# Patient Record
Sex: FEMALE | Race: WHITE | Employment: OTHER | URBAN - METROPOLITAN AREA
[De-identification: names, ages, dates, MRNs, and addresses within clinical notes are randomized per-mention and may not be internally consistent; named-entity substitution may affect disease eponyms.]

---

## 2017-08-15 RX ORDER — ASPIRIN 325 MG
1 TABLET, DELAYED RELEASE (ENTERIC COATED) ORAL
COMMUNITY

## 2017-08-15 RX ORDER — UREA 10 %
100 LOTION (ML) TOPICAL DAILY
COMMUNITY
End: 2019-01-22

## 2017-08-18 ENCOUNTER — ANESTHESIA EVENT (OUTPATIENT)
Dept: SURGERY | Age: 72
End: 2017-08-18
Payer: MEDICARE

## 2017-08-21 ENCOUNTER — HOSPITAL ENCOUNTER (OUTPATIENT)
Age: 72
Setting detail: OUTPATIENT SURGERY
Discharge: HOME | End: 2017-08-21
Attending: ORTHOPAEDIC SURGERY | Admitting: ORTHOPAEDIC SURGERY
Payer: MEDICARE

## 2017-08-21 ENCOUNTER — ANESTHESIA (OUTPATIENT)
Dept: SURGERY | Age: 72
End: 2017-08-21
Payer: MEDICARE

## 2017-08-21 VITALS
SYSTOLIC BLOOD PRESSURE: 147 MMHG | OXYGEN SATURATION: 96 % | HEART RATE: 113 BPM | DIASTOLIC BLOOD PRESSURE: 99 MMHG | RESPIRATION RATE: 23 BRPM | TEMPERATURE: 98.1 F

## 2017-08-21 DIAGNOSIS — I48.91 ATRIAL FIBRILLATION, UNSPECIFIED TYPE (HCC): Primary | ICD-10-CM

## 2017-08-21 PROCEDURE — 74011250637 HC RX REV CODE- 250/637: Performed by: NURSE ANESTHETIST, CERTIFIED REGISTERED

## 2017-08-21 PROCEDURE — 64415 NJX AA&/STRD BRCH PLXS IMG: CPT | Performed by: ANESTHESIOLOGY

## 2017-08-21 PROCEDURE — 74011250636 HC RX REV CODE- 250/636: Performed by: NURSE ANESTHETIST, CERTIFIED REGISTERED

## 2017-08-21 PROCEDURE — 93005 ELECTROCARDIOGRAM TRACING: CPT

## 2017-08-21 PROCEDURE — 74011250636 HC RX REV CODE- 250/636: Performed by: ANESTHESIOLOGY

## 2017-08-21 PROCEDURE — 74011250636 HC RX REV CODE- 250/636

## 2017-08-21 PROCEDURE — 74011250637 HC RX REV CODE- 250/637: Performed by: PHYSICIAN ASSISTANT

## 2017-08-21 PROCEDURE — 76942 ECHO GUIDE FOR BIOPSY: CPT | Performed by: ANESTHESIOLOGY

## 2017-08-21 RX ORDER — FENTANYL CITRATE 50 UG/ML
100 INJECTION, SOLUTION INTRAMUSCULAR; INTRAVENOUS ONCE
Status: COMPLETED | OUTPATIENT
Start: 2017-08-21 | End: 2017-08-21

## 2017-08-21 RX ORDER — METOPROLOL SUCCINATE 25 MG/1
25 TABLET, EXTENDED RELEASE ORAL DAILY
Status: DISCONTINUED | OUTPATIENT
Start: 2017-08-21 | End: 2017-08-21 | Stop reason: HOSPADM

## 2017-08-21 RX ORDER — LIDOCAINE HYDROCHLORIDE 10 MG/ML
0.1 INJECTION, SOLUTION EPIDURAL; INFILTRATION; INTRACAUDAL; PERINEURAL AS NEEDED
Status: DISCONTINUED | OUTPATIENT
Start: 2017-08-21 | End: 2017-08-21 | Stop reason: HOSPADM

## 2017-08-21 RX ORDER — CEFAZOLIN SODIUM 2 G/50ML
2 SOLUTION INTRAVENOUS
Status: DISCONTINUED | OUTPATIENT
Start: 2017-08-21 | End: 2017-08-21 | Stop reason: HOSPADM

## 2017-08-21 RX ORDER — MIDAZOLAM HYDROCHLORIDE 1 MG/ML
2 INJECTION, SOLUTION INTRAMUSCULAR; INTRAVENOUS ONCE
Status: COMPLETED | OUTPATIENT
Start: 2017-08-21 | End: 2017-08-21

## 2017-08-21 RX ORDER — FENTANYL CITRATE 50 UG/ML
100 INJECTION, SOLUTION INTRAMUSCULAR; INTRAVENOUS ONCE
Status: DISCONTINUED | OUTPATIENT
Start: 2017-08-21 | End: 2017-08-21 | Stop reason: HOSPADM

## 2017-08-21 RX ORDER — METOPROLOL SUCCINATE 25 MG/1
25 TABLET, EXTENDED RELEASE ORAL DAILY
Qty: 30 TAB | Refills: 1 | Status: SHIPPED | OUTPATIENT
Start: 2017-08-21 | End: 2017-10-16 | Stop reason: SDUPTHER

## 2017-08-21 RX ORDER — FAMOTIDINE 20 MG/1
20 TABLET, FILM COATED ORAL ONCE
Status: COMPLETED | OUTPATIENT
Start: 2017-08-21 | End: 2017-08-21

## 2017-08-21 RX ORDER — SODIUM CHLORIDE, SODIUM LACTATE, POTASSIUM CHLORIDE, CALCIUM CHLORIDE 600; 310; 30; 20 MG/100ML; MG/100ML; MG/100ML; MG/100ML
25 INJECTION, SOLUTION INTRAVENOUS CONTINUOUS
Status: DISCONTINUED | OUTPATIENT
Start: 2017-08-21 | End: 2017-08-21 | Stop reason: HOSPADM

## 2017-08-21 RX ADMIN — FAMOTIDINE 20 MG: 20 TABLET ORAL at 11:21

## 2017-08-21 RX ADMIN — METOPROLOL SUCCINATE 25 MG: 25 TABLET, EXTENDED RELEASE ORAL at 15:35

## 2017-08-21 RX ADMIN — FENTANYL CITRATE 50 MCG: 50 INJECTION, SOLUTION INTRAMUSCULAR; INTRAVENOUS at 11:59

## 2017-08-21 RX ADMIN — MIDAZOLAM HYDROCHLORIDE 2 MG: 1 INJECTION, SOLUTION INTRAMUSCULAR; INTRAVENOUS at 11:59

## 2017-08-21 NOTE — PROGRESS NOTES
Per Dr. Vicki Burris, Echo was ordered for Afib.      Verbal order and read back per Valery Looney MD

## 2017-08-21 NOTE — PERIOP NOTES
Spoke with Glenroy Banda at Dr. Misti Correa office regarding prescription for BB to be taken daily at home. Per Glenroy Banda, Dr. Misti Correa nurse had script for him to sign and it will be faxed to [place]. Pt has taken po Toprol XL 25mg as ordered prior to discharge. Pt discharged for home via w/c.

## 2017-08-21 NOTE — H&P
H&P Update:  [facility] was seen and examined. History and physical has been reviewed. The patient has been examined. There have been no significant clinical changes since the completion of the originally dated History and Physical.  Patient identified by surgeon; surgical site was confirmed by patient and surgeon.     Heart RRR, NOMGR  Chest CTAB    Proceed with surgery    Signed By: Moris Ross MD     August 21, 2017 12:19 PM

## 2017-08-21 NOTE — CONSULTS
[facility] - Consult Note    Date of  Admission: 8/21/2017 10:14 AM   Primary Care Physician:  Bay Nelson NP  Patient seen and examined independently. Agree with assessment below. Will arrange for prescription for her for the BB. Agree with assessment and plan as noted below. Anita Mendez MD   Assessment:     -Paroxysmal atrial fibrillation, no hx, spontaneously converted to NSR.  [id number] score 2.   -Torn rotator cuff, ambulatory surgery cancelled today and reschedule for next week. Plan:     -Will start patient on Toprol 25mg daily. (ordered). Discussed purpose of medication in detail with patient. She is in agreement with plan. -Pt is to continue 81mg ASA as she is already taking at home.    -Would like to see patient in our office in 1 week. I have sent a message to our office. They will be calling her at home to schedule an echo and her appointment. History of Present Illness: This is a 66 y.o. female with PMHx significant for rotator cuff tear who presented for day surgery to repair rotator cuff. She was given brachial block and was noted to go into afib with rvr. Pt was asymptomatic. Surgery was cancelled. Pt has no significant PMHx. She takes a multivitamin, 81mg ASA prophylactically, and vit D. She has no hx of arrhythmias or murmurs. She is not a smoker. She eats a heart healthy diet. She exercises regularly. She had one episode of syncope about ten years ago with negative work up thereafter. She had a normal stress test and no arrhythmic events were found at that time. She has not had any recurrent events.      Cardiac risk factors:   No known risk factors    Review of Symptoms:  Except as stated above include:  Constitutional:  Negative for fevers/chills, or fatigue  Respiratory:  Negative for sob, lewis, cough, or congestion  Cardiovascular:  Negative for chest pain, palpitations, LE swelling or orthopnea  Gastrointestinal: negative for abd pain, nausea or vomiting  Genitourinary:  Negative for hematuria or dysuria  Musculoskeletal:  Negative for muscle aches, positive for R shoulder pain  Neurological:  Negative for syncope or dizziness       Past Medical History:     Past Medical History:   Diagnosis Date    GERD (gastroesophageal reflux disease)          Social History:     Social History     Social History    Marital status:      Spouse name: N/A    Number of children: N/A    Years of education: N/A     Social History Main Topics    Smoking status: Former Smoker    Smokeless tobacco: Never Used    Alcohol use No    Drug use: No    Sexual activity: Not Asked     Other Topics Concern    None     Social History Narrative        Family History:   History reviewed. No pertinent family history. Medications:   No Known Allergies     Current Facility-Administered Medications   Medication Dose Route Frequency    ceFAZolin (ANCEF) 2g IVPB in 50 mL D5W  2 g IntraVENous ON CALL TO OR    lidocaine (PF) (XYLOCAINE) 10 mg/mL (1 %) injection 0.1 mL  0.1 mL SubCUTAneous PRN    lactated Ringers infusion  25 mL/hr IntraVENous CONTINUOUS    fentaNYL citrate (PF) injection 100 mcg  100 mcg IntraVENous ONCE         Physical Exam:     Visit Vitals    /60    Pulse (!) 130    Temp 98.1 °F (36.7 °C)    Resp 16    SpO2 98%     BP Readings from Last 3 Encounters:   08/21/17 135/60   08/21/13 133/74     Pulse Readings from Last 3 Encounters:   08/21/17 (!) 130   08/21/13 82     Wt Readings from Last 3 Encounters:   08/21/13 66.7 kg (147 lb)       General:  Awake, alert, oriented x 3  Neck:  Supple, no jvd  Lungs:  Clear to auscultation bilat  Heart:  Tachy, regular rhythm  Abdomen: soft, non-tender  Extremities:  No LE edema, atraumatic, no cyanosis     Data Review:     No results for input(s): WBC, HGB, HCT, PLT, HGBEXT, HCTEXT, PLTEXT in the last 72 hours.   No results for input(s): NA, K, CL, CO2, GLU, BUN, CREA, CA, MG, PHOS, ALB, TBIL, SGOT, ALT, INR in the last 72 hours. No lab exists for component:  BNP, INREXT    No results for input(s): TROIQ, CPK, CKMB in the last 72 hours.   Last Lipid:    Lab Results   Component Value Date/Time    Cholesterol, total 150 11/10/2010 08:29 AM    HDL Cholesterol 52 11/10/2010 08:29 AM    LDL cholesterol 108 07/07/2010 08:11 AM    LDL, calculated 75.4 11/10/2010 08:29 AM    Triglyceride 113 11/10/2010 08:29 AM    CHOL/HDL Ratio 2.9 11/10/2010 08:29 AM       Signed By: ALLISON Alvarez     August 21, 2017

## 2017-08-21 NOTE — ANESTHESIA PREPROCEDURE EVALUATION
Anesthetic History   No history of anesthetic complications            Review of Systems / Medical History  Patient summary reviewed and pertinent labs reviewed    Pulmonary  Within defined limits                 Neuro/Psych   Within defined limits           Cardiovascular                  Exercise tolerance: >4 METS     GI/Hepatic/Renal  Within defined limits   GERD           Endo/Other  Within defined limits           Other Findings   Comments:   Risk Factors for Postoperative nausea/vomiting:       History of postoperative nausea/vomiting? NO       Female? YES       Motion sickness? NO       Intended opioid administration for postoperative analgesia? YES      Smoking Abstinence  Current Smoker? NO  Elective Surgery? YES  Seen preoperatively by anesthesiologist or proxy prior to day of surgery? YES  Pt abstained from smoking 24 hours prior to anesthesia?  YES               Physical Exam    Airway  Mallampati: II  TM Distance: 4 - 6 cm  Neck ROM: normal range of motion   Mouth opening: Normal     Cardiovascular    Rhythm: regular  Rate: normal         Dental  No notable dental hx       Pulmonary  Breath sounds clear to auscultation               Abdominal  GI exam deferred       Other Findings            Anesthetic Plan    ASA: 2  Anesthesia type: general and regional - interscalene block          Induction: Intravenous  Anesthetic plan and risks discussed with: Patient

## 2017-08-21 NOTE — PROGRESS NOTES
Anesthesia Note    Patient developed new onset atrial fibrillation after interscalene block. Discussed with PCP, Alireza CHEW, who asked for in-house cardiology consult. Dr. Reyna Babinski evaluated the patient and will workup patient has an outpatient. Patient okay to go home.     8/21/2017  5:40 PM  Deneen Cutler MD

## 2017-08-21 NOTE — ANESTHESIA PROCEDURE NOTES
Peripheral Block    Start time: 8/21/2017 11:50 AM  End time: 8/21/2017 12:08 PM  Performed by: Ollie Villarreal  Authorized by: Ollie Villarreal       Pre-procedure: Indications: at surgeon's request and post-op pain management    Preanesthetic Checklist: patient identified, risks and benefits discussed, site marked, timeout performed, anesthesia consent given and patient being monitored    Timeout Time: 11:50          Block Type:   Block Type:   Interscalene  Laterality:  Right  Monitoring:  Standard ASA monitoring  Injection Technique:  Single shot  Procedures: ultrasound guided    Patient Position: supine  Prep: chlorhexidine    Location:  Interscalene  Needle Type:  Ultraplex  Needle Gauge:  22 G  Needle Localization:  Ultrasound guidance  Medication Injected:  0.5%  bupivacaine  Adds:  Epi 1:200K  Volume (mL):  30    Assessment:  Number of attempts:  1  Injection Assessment:  Incremental injection every 5 mL, local visualized surrounding nerve on ultrasound, negative aspiration for blood, no intravascular symptoms, no paresthesia, negative aspiration for CSF and ultrasound image on chart  Patient tolerance:  Patient tolerated the procedure well with no immediate complications  Signed by Dr. Marielena Lopez

## 2017-08-21 NOTE — ANESTHESIA PROCEDURE NOTES
Peripheral Block    Start time: 8/21/2017 11:50 AM  End time: 8/21/2017 12:02 PM  Performed by: Lori Darby  Authorized by: Lori Darby       Pre-procedure: Indications: at surgeon's request and post-op pain management    Preanesthetic Checklist: patient identified, risks and benefits discussed, site marked, timeout performed, anesthesia consent given and patient being monitored    Timeout Time: 11:50          Block Type:   Block Type:   Interscalene  Laterality:  Right  Monitoring:  Standard ASA monitoring  Injection Technique:  Single shot  Procedures: ultrasound guided    Patient Position: supine  Prep: chlorhexidine    Location:  Interscalene  Needle Type:  Ultraplex  Needle Gauge:  22 G  Needle Localization:  Ultrasound guidance  Medication Injected:  0.5%  bupivacaine  Adds:  Epi 1:200K  Volume (mL):  30    Assessment:  Number of attempts:  1  Injection Assessment:  Incremental injection every 5 mL, negative aspiration for CSF, no paresthesia, ultrasound image on chart, no intravascular symptoms, negative aspiration for blood and local visualized surrounding nerve on ultrasound  Patient tolerance:  Patient tolerated the procedure well with no immediate complications  Signed by Dr. Peri Kinsey

## 2017-08-22 LAB
ATRIAL RATE: 102 BPM
CALCULATED R AXIS, ECG10: -8 DEGREES
CALCULATED T AXIS, ECG11: -97 DEGREES
DIAGNOSIS, 93000: NORMAL
Q-T INTERVAL, ECG07: 340 MS
QRS DURATION, ECG06: 88 MS
QTC CALCULATION (BEZET), ECG08: 500 MS
VENTRICULAR RATE, ECG03: 130 BPM

## 2017-08-25 ENCOUNTER — HOSPITAL ENCOUNTER (OUTPATIENT)
Dept: NON INVASIVE DIAGNOSTICS | Age: 72
Discharge: HOME | End: 2017-08-25
Attending: INTERNAL MEDICINE
Payer: MEDICARE

## 2017-08-25 DIAGNOSIS — I48.91 ATRIAL FIBRILLATION, UNSPECIFIED TYPE (HCC): ICD-10-CM

## 2017-08-25 PROCEDURE — 93306 TTE W/DOPPLER COMPLETE: CPT

## 2017-08-28 ENCOUNTER — OFFICE VISIT (OUTPATIENT)
Dept: CARDIOLOGY CLINIC | Age: 72
End: 2017-08-28

## 2017-08-28 VITALS
OXYGEN SATURATION: 98 % | SYSTOLIC BLOOD PRESSURE: 148 MMHG | HEIGHT: 65 IN | WEIGHT: 149 LBS | BODY MASS INDEX: 24.83 KG/M2 | HEART RATE: 74 BPM | DIASTOLIC BLOOD PRESSURE: 76 MMHG

## 2017-08-28 DIAGNOSIS — I48.0 PAF (PAROXYSMAL ATRIAL FIBRILLATION) (HCC): ICD-10-CM

## 2017-08-28 DIAGNOSIS — I77.9 CAROTID DISEASE, BILATERAL (HCC): ICD-10-CM

## 2017-08-28 DIAGNOSIS — I51.89 DIASTOLIC DYSFUNCTION: ICD-10-CM

## 2017-08-28 DIAGNOSIS — Z01.818 PREOPERATIVE CLEARANCE: ICD-10-CM

## 2017-08-28 DIAGNOSIS — R00.2 PALPITATIONS: ICD-10-CM

## 2017-08-28 DIAGNOSIS — I48.91 ATRIAL FIBRILLATION, UNSPECIFIED TYPE (HCC): Primary | ICD-10-CM

## 2017-08-28 NOTE — MR AVS SNAPSHOT
Visit Information Date & Time Provider Department Dept. Phone Encounter #  
 8/28/2017  1:00 PM Anna Zhou MD [address] [place] [address] Specialist at [facility] [phone] [id number] Follow-up Instructions Return in about 6 weeks (around 10/9/2017). Upcoming Health Maintenance Date Due DTaP/Tdap/Td series (1 - Tdap) 11/12/1965 FOBT Q 1 YEAR AGE 50-75 11/12/1994 ZOSTER VACCINE AGE 60> 9/12/2004 GLAUCOMA SCREENING Q2Y 11/12/2009 Pneumococcal 65+ Low/Medium Risk (1 of 2 - PCV13) 11/12/2009 MEDICARE YEARLY EXAM 11/12/2009 BREAST CANCER SCRN MAMMOGRAM 6/20/2013 INFLUENZA AGE 9 TO ADULT 8/1/2017 Allergies as of 8/28/2017  Review Complete On: 8/28/2017 By: Phi Sierra LPN No Known Allergies Current Immunizations  Never Reviewed No immunizations on file. Not reviewed this visit You Were Diagnosed With   
  
 Codes Comments At[facility] unspecified [facility]    -  Primary ICD-10-CM: I48.91 
ICD-9-CM: 427.31 Vitals BP Pulse Height(growth percentile) Weight(growth percentile) SpO2 BMI  
 148/76 74 5' 5\" (1.651 m) 149 lb (67.6 kg) 98% 24.79 kg/m2 OB Status Smoking Status Hysterectomy Former Smoker BMI and BSA Data Body Mass Index Body Surface Area 24.79 kg/m 2 1.76 m 2 Preferred Pharmacy Pharmacy Name Phone [address] [place] [address] [phone] Your Updated Medication List  
  
   
This list is accurate as of: 8/28/17  1:56 PM.  Always use your most recent med list.  
  
  
  
  
 aspirin 81 mg tablet Take 81 mg by mouth.  
  
 calcium 600 mg Cap Take  by mouth. Cholecalciferol (Vitamin D3) 50,000 unit Cap Take 1 Cap by mouth every seven (7) days. cyanocobalamin 100 mcg tablet Commonly known as:  VITAMIN B12 Take 100 mcg by mouth daily. metoprolol succinate 25 mg XL tablet Commonly known as:  TOPROL-XL Take 1 Tab by mouth daily. omega 3-dha-epa-fish oil 100-160-1,000 mg Cap Take  by mouth. We Performed the Following AMB POC EKG ROUTINE W/ 12 LEADS, INTER & REP [34120 CPT(R)] Follow-up Instructions Return in about 6 weeks (around 10/9/2017). Introducing [facility]! Julia Dangelo introduces [employer] patient portal. Now you can access parts of your medical record, email your doctor's office, and request medication refills online. 1. In your internet browser, go to [email or web address] 2. Click on the First Time User? Click Here link in the Sign In box. You will see the New Member Sign Up page. 3. Enter your [employer] Access Code exactly as it appears below. You will not need to use this code after youve completed the sign-up process. If you do not sign up before the expiration date, you must request a new code. · [employer] Access Code: [email or web address] Expires: 11/19/2017 10:11 AM 
 
4. Enter the last four digits of your Social Security Number (xxxx) and Date of Birth (mm/dd/yyyy) as indicated and click Submit. You will be taken to the next sign-up page. 5. Create a [employer] ID. This will be your [employer] login ID and cannot be changed, so think of one that is secure and easy to remember. 6. Create a [employer] password. You can change your password at any time. 7. Enter your Password Reset Question and Answer. This can be used at a later time if you forget your password. 8. Enter your e-mail address. You will receive e-mail notification when new information is available in [address] 9. Click Sign Up. You can now view and download portions of your medical record. 10. Click the Download Summary menu link to download a portable copy of your medical information. If you have questions, please visit the Frequently Asked Questions section of the [employer] website.  Remember, [employer] is NOT to be used for urgent needs. For medical emergencies, dial 911. Now available from your iPhone and Android! Please provide this summary of care documentation to your next provider. Your primary care clinician is listed as Dorene Severe. If you have any questions after today's visit, please call [phone].

## 2017-08-28 NOTE — PROGRESS NOTES
1. Have you been to the ER, urgent care clinic since your last visit? Hospitalized since your last visit? No    2. Have you seen or consulted any other health care providers outside of the [address] [place] [address] since your last visit? Include any pap smears or colon screening.  No

## 2017-08-31 PROBLEM — Z01.818 PREOPERATIVE CLEARANCE: Status: ACTIVE | Noted: 2017-08-31

## 2017-08-31 PROBLEM — I51.89 DIASTOLIC DYSFUNCTION: Status: ACTIVE | Noted: 2017-08-31

## 2017-08-31 PROBLEM — R00.2 PALPITATIONS: Status: ACTIVE | Noted: 2017-08-31

## 2017-08-31 PROBLEM — I77.9 CAROTID DISEASE, BILATERAL (HCC): Status: ACTIVE | Noted: 2017-08-31

## 2017-08-31 PROBLEM — I48.0 PAF (PAROXYSMAL ATRIAL FIBRILLATION) (HCC): Status: ACTIVE | Noted: 2017-08-31

## 2017-08-31 NOTE — PROGRESS NOTES
Subjective:      Sana Gupta is in the office today for cardiac evaluation. She is a 66year-old woman that was seen at [facility] on 08/21/2017. At that time, she was about to have rotator cuff surgery. She developed atrial fibrillation while in the presurgical area. A short time later, she did spontaneously convert to sinus rhythm. She was given a prescription for low dose beta-blocker in the form of Toprol XL 25 mg a day and was told to follow up in the office, which is why she is here today. In the office today, she says she is doing well. She does notice some palpitations where she says it feels like her heart beats faster at times. She has been walking in the morning without any limiting symptoms. She has had no chest pain. She has had no limiting shortness of breath, PND or orthopnea. She has had no near syncope or syncope. Her calculated CHADS2- VASc score was 3 with a 3.2% incidence of stroke per year without anticoagulation. Patient Active Problem List    Diagnosis Date Noted    Preoperative clearance 08/31/2017    PAF (paroxysmal atrial fibrillation) ([facility]) 16/93/1549    Diastolic dysfunction 27/80/6173    Palpitations 08/31/2017    Carotid disease, bilateral ([facility] 08/31/2017     Current Outpatient Prescriptions   Medication Sig Dispense Refill    metoprolol succinate (TOPROL-XL) 25 mg XL tablet Take 1 Tab by mouth daily. 30 Tab 1    cyanocobalamin (VITAMIN B12) 100 mcg tablet Take 100 mcg by mouth daily.  Cholecalciferol, Vitamin D3, 50,000 unit cap Take 1 Cap by mouth every seven (7) days.  aspirin 81 mg tablet Take 81 mg by mouth.  omega 3-dha-epa-fish oil 100-160-1,000 mg cap Take  by mouth.  calcium 600 mg cap Take  by mouth.        No Known Allergies  Past Medical History:   Diagnosis Date    GERD (gastroesophageal reflux disease)      Past Surgical History:   Procedure Laterality Date    ABDOMEN SURGERY PROC UNLISTED appendectomy    HX GYN      partial hysterectomy    HX ORTHOPAEDIC      knee arthroscopic, Bunionectomy    HX ROTATOR CUFF REPAIR Right      No family history on file. History   Smoking Status    Former Smoker   Smokeless Tobacco    Never Used          Review of Systems, additional:  Constitutional: negative  Eyes: negative  Respiratory: negative  Cardiovascular: positive for palpitations  Gastrointestinal: negative  Musculoskeletal:negative  Neurological: negative  Behvioral/Psych: negative  Endocrine: negative  ENT: negative    Objective:     Visit Vitals    /76    Pulse 74    Ht 5' 5\" (1.651 m)    Wt 149 lb (67.6 kg)    SpO2 98%    BMI 24.79 kg/m2     General:  alert, cooperative, no distress   Chest Wall: inspection normal - no chest wall deformities or tenderness, respiratory effort normal   Lung: clear to auscultation bilaterally   Heart:  normal rate and regular rhythm, S1 and S2 normal, no murmurs noted, no gallops noted   Abdomen: soft, non-tender. Bowel sounds normal. No masses,  no organomegaly   Extremities: extremities normal, atraumatic, no cyanosis or edema Skin: no rashes   Neuro: alert, oriented, normal speech, no focal findings or movement disorder noted     EK2017; Sinus rhythm. Diffuse nondiagnostic ST & T wave abnormalities    Assessment/Plan:       ICD-10-CM ICD-9-CM    1. Atrial fibrillation, unspecified type ([address] in sinus rhythm in office today I48.91 427.31 AMB POC EKG ROUTINE W/ 12 LEADS, INTER & REP   2. PAF (paroxysmal atrial fibrillation) ([facility]) I48.0 427.31    3. Diastolic dysfunction, recent echo 2536; normal systolic function. Grade 1 diastolic dysfunction. Mild LAE. I51.9 429.9    4. Palpitations R00.2 785.1    5. Preoperative clearance, plan to repeat 12 lead in office in 10 days. Continue BB. RT 6 weeks. No cardiac contraindication to proposed surgery. Z01.818 V72.84    6.  Carotid disease, bilateral ([facility]) I77.9 447.9

## 2017-09-07 ENCOUNTER — CLINICAL SUPPORT (OUTPATIENT)
Dept: CARDIOLOGY CLINIC | Age: 72
End: 2017-09-07

## 2017-09-07 ENCOUNTER — DOCUMENTATION ONLY (OUTPATIENT)
Dept: CARDIOLOGY CLINIC | Age: 72
End: 2017-09-07

## 2017-09-07 DIAGNOSIS — I48.0 PAF (PAROXYSMAL ATRIAL FIBRILLATION) (HCC): Primary | ICD-10-CM

## 2017-09-08 VITALS — OXYGEN SATURATION: 98 % | SYSTOLIC BLOOD PRESSURE: 140 MMHG | HEART RATE: 76 BPM | DIASTOLIC BLOOD PRESSURE: 70 MMHG

## 2017-09-08 NOTE — PROGRESS NOTES
Sharon Cordoba is a 66 y.o. female that is here for a blood pressure check. Her current medications are listed below. Current Outpatient Prescriptions   Medication Sig    metoprolol succinate (TOPROL-XL) 25 mg XL tablet Take 1 Tab by mouth daily.  cyanocobalamin (VITAMIN B12) 100 mcg tablet Take 100 mcg by mouth daily.  Cholecalciferol, Vitamin D3, 50,000 unit cap Take 1 Cap by mouth every seven (7) days.  aspirin 81 mg tablet Take 81 mg by mouth.  omega 3-dha-epa-fish oil 100-160-1,000 mg cap Take  by mouth.  calcium 600 mg cap Take  by mouth. No current facility-administered medications for this visit. Her   Visit Vitals    /70    Pulse 76    SpO2 98%             Patient was here today for a blood pressure check and EKG for surgery clearance. Patient has no complaints or concerns at this time. Vitals are as above. Plan:     Consulted with Dr Shruthi Hutson in the office today. Per Dr Shruthi Hutson, patient is cleared for surgery after reviewing EKG results. Patient to continue all medications. Patient verbalized understanding. Verbal order and read back per Dr Santa Morris.

## 2017-09-12 ENCOUNTER — TELEPHONE (OUTPATIENT)
Dept: CARDIOLOGY CLINIC | Age: 72
End: 2017-09-12

## 2017-09-12 NOTE — TELEPHONE ENCOUNTER
Verbal order and read back per Akila Samuel MD    Patient is cleared for surgery. Continue metoprolol, at least for a month after surgery then she will need to come back to follow up with Dr. Xavi Huffman. Clearance faxed to Dr. Adelita Campa at [phone] and Dr. Markus Pereyra [phone].

## 2017-09-29 ENCOUNTER — ANESTHESIA EVENT (OUTPATIENT)
Dept: SURGERY | Age: 72
End: 2017-09-29
Payer: MEDICARE

## 2017-09-29 RX ORDER — FLUTICASONE PROPIONATE 50 MCG
1 SPRAY, SUSPENSION (ML) NASAL AS NEEDED
COMMUNITY
Start: 2017-07-14 | End: 2018-07-02 | Stop reason: ALTCHOICE

## 2017-10-02 ENCOUNTER — ANESTHESIA (OUTPATIENT)
Dept: SURGERY | Age: 72
End: 2017-10-02
Payer: MEDICARE

## 2017-10-02 ENCOUNTER — HOSPITAL ENCOUNTER (OUTPATIENT)
Age: 72
Setting detail: OUTPATIENT SURGERY
Discharge: HOME | End: 2017-10-02
Attending: ORTHOPAEDIC SURGERY | Admitting: ORTHOPAEDIC SURGERY
Payer: MEDICARE

## 2017-10-02 VITALS
HEART RATE: 67 BPM | SYSTOLIC BLOOD PRESSURE: 155 MMHG | TEMPERATURE: 97 F | RESPIRATION RATE: 18 BRPM | DIASTOLIC BLOOD PRESSURE: 72 MMHG | WEIGHT: 150 LBS | OXYGEN SATURATION: 96 % | HEIGHT: 62 IN | BODY MASS INDEX: 27.6 KG/M2

## 2017-10-02 PROCEDURE — 74011000250 HC RX REV CODE- 250

## 2017-10-02 PROCEDURE — 77030006884 HC BLD SHV INCIS S&N -B: Performed by: ORTHOPAEDIC SURGERY

## 2017-10-02 PROCEDURE — C9353 NEURAWRAP NERVE PROTECTOR,CM: HCPCS | Performed by: ORTHOPAEDIC SURGERY

## 2017-10-02 PROCEDURE — 77030008463 HC STPLR SKN PROX J&J -B: Performed by: ORTHOPAEDIC SURGERY

## 2017-10-02 PROCEDURE — 76060000035 HC ANESTHESIA 2 TO 2.5 HR: Performed by: ORTHOPAEDIC SURGERY

## 2017-10-02 PROCEDURE — 74011000250 HC RX REV CODE- 250: Performed by: ORTHOPAEDIC SURGERY

## 2017-10-02 PROCEDURE — 77030008477 HC STYL SATN SLP COVD -A: Performed by: ANESTHESIOLOGY

## 2017-10-02 PROCEDURE — 77030032490 HC SLV COMPR SCD KNE COVD -B: Performed by: ORTHOPAEDIC SURGERY

## 2017-10-02 PROCEDURE — 77030010427: Performed by: ORTHOPAEDIC SURGERY

## 2017-10-02 PROCEDURE — 77030010428: Performed by: ORTHOPAEDIC SURGERY

## 2017-10-02 PROCEDURE — 77030025635 HC SUT PASS XPRS J&J -C: Performed by: ORTHOPAEDIC SURGERY

## 2017-10-02 PROCEDURE — 74011250636 HC RX REV CODE- 250/636: Performed by: NURSE ANESTHETIST, CERTIFIED REGISTERED

## 2017-10-02 PROCEDURE — 77030004451 HC BUR SHV S&N -B: Performed by: ORTHOPAEDIC SURGERY

## 2017-10-02 PROCEDURE — 74011250636 HC RX REV CODE- 250/636: Performed by: ANESTHESIOLOGY

## 2017-10-02 PROCEDURE — 74011250636 HC RX REV CODE- 250/636: Performed by: ORTHOPAEDIC SURGERY

## 2017-10-02 PROCEDURE — 74011250636 HC RX REV CODE- 250/636

## 2017-10-02 PROCEDURE — C1713 ANCHOR/SCREW BN/BN,TIS/BN: HCPCS | Performed by: ORTHOPAEDIC SURGERY

## 2017-10-02 PROCEDURE — 77030011640 HC PAD GRND REM COVD -A: Performed by: ORTHOPAEDIC SURGERY

## 2017-10-02 PROCEDURE — 77030013079 HC BLNKT BAIR HGGR 3M -A: Performed by: ANESTHESIOLOGY

## 2017-10-02 PROCEDURE — 76210000021 HC REC RM PH II 0.5 TO 1 HR: Performed by: ORTHOPAEDIC SURGERY

## 2017-10-02 PROCEDURE — 77030002916 HC SUT ETHLN J&J -A: Performed by: ORTHOPAEDIC SURGERY

## 2017-10-02 PROCEDURE — 76942 ECHO GUIDE FOR BIOPSY: CPT | Performed by: ORTHOPAEDIC SURGERY

## 2017-10-02 PROCEDURE — 77030020274 HC MISC IMPL ORTHOPEDIC: Performed by: ORTHOPAEDIC SURGERY

## 2017-10-02 PROCEDURE — 76210000016 HC OR PH I REC 1 TO 1.5 HR: Performed by: ORTHOPAEDIC SURGERY

## 2017-10-02 PROCEDURE — 77030018834: Performed by: ORTHOPAEDIC SURGERY

## 2017-10-02 PROCEDURE — 77030038012 HC WND COBLATN S&N -F: Performed by: ORTHOPAEDIC SURGERY

## 2017-10-02 PROCEDURE — 77030008683 HC TU ET CUF COVD -A: Performed by: ANESTHESIOLOGY

## 2017-10-02 PROCEDURE — 64450 NJX AA&/STRD OTHER PN/BRANCH: CPT | Performed by: ORTHOPAEDIC SURGERY

## 2017-10-02 PROCEDURE — 74011250637 HC RX REV CODE- 250/637

## 2017-10-02 PROCEDURE — 74011000272 HC RX REV CODE- 272: Performed by: ORTHOPAEDIC SURGERY

## 2017-10-02 PROCEDURE — 76010000131 HC OR TIME 2 TO 2.5 HR: Performed by: ORTHOPAEDIC SURGERY

## 2017-10-02 DEVICE — HEALIX ADVANCE BR 3 SUTURE ANCHOR W/ORTHOCORD TCP/PLGA ABSORBABLE ANCHOR (1) VIOLET (1) BLUE STRAND (1) BLUE STRIPED, SIZE 2 (5 METRIC) ORTHOCORD BRAIDED COMPOSITE SUTURE, 36 INCHES (91CM) 4.5MM
Type: IMPLANTABLE DEVICE | Site: SHOULDER | Status: FUNCTIONAL
Brand: HEALIX ADVANCE ORTHOCORD

## 2017-10-02 DEVICE — IMPLANT BIO TISS CLLGN TEND WRP TENOMEND: Type: IMPLANTABLE DEVICE | Site: SHOULDER | Status: FUNCTIONAL

## 2017-10-02 RX ORDER — FAMOTIDINE 20 MG/1
20 TABLET, FILM COATED ORAL ONCE
Status: COMPLETED | OUTPATIENT
Start: 2017-10-02 | End: 2017-10-02

## 2017-10-02 RX ORDER — SODIUM CHLORIDE, SODIUM LACTATE, POTASSIUM CHLORIDE, CALCIUM CHLORIDE 600; 310; 30; 20 MG/100ML; MG/100ML; MG/100ML; MG/100ML
50 INJECTION, SOLUTION INTRAVENOUS CONTINUOUS
Status: DISCONTINUED | OUTPATIENT
Start: 2017-10-02 | End: 2017-10-02 | Stop reason: HOSPADM

## 2017-10-02 RX ORDER — DEXAMETHASONE SODIUM PHOSPHATE 4 MG/ML
INJECTION, SOLUTION INTRA-ARTICULAR; INTRALESIONAL; INTRAMUSCULAR; INTRAVENOUS; SOFT TISSUE AS NEEDED
Status: DISCONTINUED | OUTPATIENT
Start: 2017-10-02 | End: 2017-10-02 | Stop reason: HOSPADM

## 2017-10-02 RX ORDER — FENTANYL CITRATE 50 UG/ML
50 INJECTION, SOLUTION INTRAMUSCULAR; INTRAVENOUS AS NEEDED
Status: DISCONTINUED | OUTPATIENT
Start: 2017-10-02 | End: 2017-10-02 | Stop reason: HOSPADM

## 2017-10-02 RX ORDER — FENTANYL CITRATE 50 UG/ML
INJECTION, SOLUTION INTRAMUSCULAR; INTRAVENOUS AS NEEDED
Status: DISCONTINUED | OUTPATIENT
Start: 2017-10-02 | End: 2017-10-02 | Stop reason: HOSPADM

## 2017-10-02 RX ORDER — MIDAZOLAM HYDROCHLORIDE 1 MG/ML
2 INJECTION, SOLUTION INTRAMUSCULAR; INTRAVENOUS ONCE
Status: COMPLETED | OUTPATIENT
Start: 2017-10-02 | End: 2017-10-02

## 2017-10-02 RX ORDER — FENTANYL CITRATE 50 UG/ML
100 INJECTION, SOLUTION INTRAMUSCULAR; INTRAVENOUS ONCE
Status: COMPLETED | OUTPATIENT
Start: 2017-10-02 | End: 2017-10-02

## 2017-10-02 RX ORDER — FAMOTIDINE 20 MG/1
TABLET, FILM COATED ORAL
Status: COMPLETED
Start: 2017-10-02 | End: 2017-10-02

## 2017-10-02 RX ORDER — SUCCINYLCHOLINE CHLORIDE 20 MG/ML
INJECTION INTRAMUSCULAR; INTRAVENOUS AS NEEDED
Status: DISCONTINUED | OUTPATIENT
Start: 2017-10-02 | End: 2017-10-02 | Stop reason: HOSPADM

## 2017-10-02 RX ORDER — SODIUM CHLORIDE 0.9 % (FLUSH) 0.9 %
5-10 SYRINGE (ML) INJECTION AS NEEDED
Status: DISCONTINUED | OUTPATIENT
Start: 2017-10-02 | End: 2017-10-02 | Stop reason: HOSPADM

## 2017-10-02 RX ORDER — OXYCODONE AND ACETAMINOPHEN 5; 325 MG/1; MG/1
1-2 TABLET ORAL
Qty: 50 TAB | Refills: 0 | Status: SHIPPED | OUTPATIENT
Start: 2017-10-02 | End: 2017-10-16 | Stop reason: SINTOL

## 2017-10-02 RX ORDER — PROPOFOL 10 MG/ML
INJECTION, EMULSION INTRAVENOUS AS NEEDED
Status: DISCONTINUED | OUTPATIENT
Start: 2017-10-02 | End: 2017-10-02 | Stop reason: HOSPADM

## 2017-10-02 RX ORDER — INSULIN LISPRO 100 [IU]/ML
INJECTION, SOLUTION INTRAVENOUS; SUBCUTANEOUS ONCE
Status: DISCONTINUED | OUTPATIENT
Start: 2017-10-02 | End: 2017-10-02 | Stop reason: HOSPADM

## 2017-10-02 RX ORDER — ONDANSETRON 2 MG/ML
INJECTION INTRAMUSCULAR; INTRAVENOUS AS NEEDED
Status: DISCONTINUED | OUTPATIENT
Start: 2017-10-02 | End: 2017-10-02 | Stop reason: HOSPADM

## 2017-10-02 RX ORDER — HYDROMORPHONE HYDROCHLORIDE 2 MG/ML
0.5 INJECTION, SOLUTION INTRAMUSCULAR; INTRAVENOUS; SUBCUTANEOUS
Status: DISCONTINUED | OUTPATIENT
Start: 2017-10-02 | End: 2017-10-02 | Stop reason: HOSPADM

## 2017-10-02 RX ORDER — LIDOCAINE HYDROCHLORIDE 20 MG/ML
INJECTION, SOLUTION EPIDURAL; INFILTRATION; INTRACAUDAL; PERINEURAL AS NEEDED
Status: DISCONTINUED | OUTPATIENT
Start: 2017-10-02 | End: 2017-10-02 | Stop reason: HOSPADM

## 2017-10-02 RX ORDER — CEFAZOLIN SODIUM 2 G/50ML
2 SOLUTION INTRAVENOUS
Status: COMPLETED | OUTPATIENT
Start: 2017-10-02 | End: 2017-10-02

## 2017-10-02 RX ADMIN — FENTANYL CITRATE 100 MCG: 50 INJECTION, SOLUTION INTRAMUSCULAR; INTRAVENOUS at 09:20

## 2017-10-02 RX ADMIN — FAMOTIDINE 20 MG: 20 TABLET ORAL at 08:44

## 2017-10-02 RX ADMIN — PROPOFOL 100 MG: 10 INJECTION, EMULSION INTRAVENOUS at 09:25

## 2017-10-02 RX ADMIN — CEFAZOLIN SODIUM 2 G: 2 SOLUTION INTRAVENOUS at 09:20

## 2017-10-02 RX ADMIN — FAMOTIDINE 20 MG: 20 TABLET, FILM COATED ORAL at 08:44

## 2017-10-02 RX ADMIN — SODIUM CHLORIDE, SODIUM LACTATE, POTASSIUM CHLORIDE, AND CALCIUM CHLORIDE 50 ML/HR: 600; 310; 30; 20 INJECTION, SOLUTION INTRAVENOUS at 08:44

## 2017-10-02 RX ADMIN — FENTANYL CITRATE 100 MCG: 50 INJECTION, SOLUTION INTRAMUSCULAR; INTRAVENOUS at 08:45

## 2017-10-02 RX ADMIN — MIDAZOLAM HYDROCHLORIDE 2 MG: 1 INJECTION, SOLUTION INTRAMUSCULAR; INTRAVENOUS at 08:45

## 2017-10-02 RX ADMIN — LIDOCAINE HYDROCHLORIDE 50 MG: 20 INJECTION, SOLUTION EPIDURAL; INFILTRATION; INTRACAUDAL; PERINEURAL at 09:25

## 2017-10-02 RX ADMIN — SUCCINYLCHOLINE CHLORIDE 100 MG: 20 INJECTION INTRAMUSCULAR; INTRAVENOUS at 09:26

## 2017-10-02 RX ADMIN — ONDANSETRON 4 MG: 2 INJECTION INTRAMUSCULAR; INTRAVENOUS at 09:56

## 2017-10-02 RX ADMIN — DEXAMETHASONE SODIUM PHOSPHATE 8 MG: 4 INJECTION, SOLUTION INTRA-ARTICULAR; INTRALESIONAL; INTRAMUSCULAR; INTRAVENOUS; SOFT TISSUE at 09:56

## 2017-10-02 NOTE — OP NOTES
Brandon Esposito    Name:  Yohana Núñez  MR#:  [id number]  :  1945  Account #:  [de-identified]  Date of Adm:  10/02/2017  Date of Surgery:  10/02/2017      PREOPERATIVE DIAGNOSIS: Right shoulder rotator cuff tear with  impingement and acromioclavicular degenerative joint disease. POSTOPERATIVE DIAGNOSES: Right shoulder rotator cuff tear with  bursitis and chronic deltoid muscle fascial defect. PROCEDURES PERFORMED  1. Right shoulder arthroscopy with open rotator cuff repair and  bursectomy. 2. Repair of chronic deltoid fascial tear deep, PRP and TenoMend. SURGEON: Marta Ortiz MD.    ANESTHESIA: General with block. COMPLICATIONS: None. ESTIMATED BLOOD LOSS: Minimal.    SPECIMENS REMOVED: None. IMPLANTS: [place] suture helix 4.5 mm Mitek x2, TenoMend graft. INDICATIONS FOR PROCEDURE: The patient is a pleasant 69-year-  old white female with history of a prior surgery, rotator cuff repair. She  has failed. Had persistent pain and problems. She was evaluated  elsewhere and to be adequate for surgery. Is brought to the operative  suite here for definitive management once full informed consent was  obtained. Risks and benefits explained in full including, but not limited  to bleeding, infection, damage, pain, stiffness, swelling, further surgery,  revision surgery and complete resolution of symptoms. DESCRIPTION OF PROCEDURE: The patient was brought to  operative suite, placed on the table in supine position. She was  intubated with general endotracheal anesthesia. She was given  interscalene block. We proceeded to place in lateral decubitus position. All prominent points were padded. She was secured in the beanbag. Her right arm was placed in traction, 10 pounds. After this was done,  we proceeded to prep and drape in normal sterile fashion. Standard  posterior portal was made.  Anterior portal was made through anterior  interval under direct visualization with a switching stick. We proceeded  to explore the shoulder with the following findings. The glenohumeral  joint was grossly unremarkable. Some fraying on the labrum was  appreciated circumferentially. A large rotator cuff defect was  appreciated, 2.5 cm x 1.5 cm in size. It was a U-shaped type tear. Evidence of prior sutures were appreciated with evidence of an old,  what appeared to be knotless implant, which was somewhat prominent  over the lateral shoulder at the greater tuberosity. We proceeded to  explore subacromial space. It was found be a large chronic fascial  defect at the deltoid. This was palpable laterally. Fluid did bulge from  this area grossly secondary to the fascial defect. This measured  approximately 4 cm x 3 cm in size over the lateral shoulder. After this was done, we proceeded to then turn our attention to the  glenohumeral joint. Extensive debridement of the glenohumeral joint  was done. The undersurface of the rotator cuff was debrided. We  proceeded our attention to the subacromial space. An extensive  bursectomy was done arthroscopically. This further identified the prior  anchor repair, was appreciated, which was found to be significant. The  metal anchor was found to be embedded significantly in bone, but was  found to be somewhat prominent by 3-4 mm. We proceeded to then  transition to an open case. Directly over the prior incision at the fascial  defect, we made an approximately 2.5 cm incision, dissected down,  identified the fascial defect, essentially went straight through skin to the  subacromial space. The deltoid was identified and was found to be  grossly intact without atrophy anteriorly or posteriorly other than just  the defect itself. We proceeded to identify the area of tear, which was  identified through the scope as well. We proceeded to identify the prior  anchor. It was thought that extracting this would cause significant bone  damage.  As such, we proceeded to push this further into the bone, into  the subchondral bone, so that it was not prominent. This was  adequate. No residual prominence of the old anchor was appreciated. After this was done, we proceeded to debride the greater tuberosity to  good bleeding bone. We proceeded to place 2 anchors, one anterior  and one posterior avoiding the prior anchor using 4.5 mm triple loaded  Mitek anchors. Once this was done, we proceeded to pass the anchors  in standard Saurabh-Matt technique through the rotator cuff after  converging the rotator cuff with Orthocord stitches from medial to  lateral, down to a 1.5 x 1 cm tear. After this was done, we proceeded  to place TenoMend around the area of the tear in a \"anila bread\" type  fashion at the distal rotator cuff. We proceeded to then tied down the  sutures in standard fashion incorporating the TenoMend into the repair. The rotator cuff repair was found to be grossly watertight. PRP was  injected into the area with copious irrigation, as well as into the graft  itself. After this was done, we proceeded to turn our attention to the fascial  defect in the deltoid. The fascia was undermined both anteriorly and  posteriorly to provide adequate fascial repair. We proceeded to then  perform a systematic repair from distal to proximal, double layer type  repair. A 0 Vicryl stitch was utilized in a figure-of-eight fashion from  distal to proximal to close the deep portion of the fascia. We then  subsequently proceeded to, in a similar fashion, close the superficial  portion of the fascia, conversion of the deltoid defect in standard  fashion from anterior to posterior in particular. No residual problems to  the stitches were appreciated. A watertight rotator cuff was found to  move smoothly. We proceeded to close the subcutaneous tissue with  3-0 Vicryl stitch. The skin was closed with running Monocryl. Dry sterile  dressing was placed.  She was placed in a sling immobilizer. Portal  incisions were closed with nylon dressing of note prior to this. She was  awakened from anesthesia, brought to postop care in stable condition.         MD VENITA Saunders / LEAH  D:  10/02/2017   10:58  T:  10/02/2017   11:56  Job #:  [id number]

## 2017-10-02 NOTE — IP AVS SNAPSHOT
Richard Lafleur 
 
 
 [address] 
[phone] Patient: Julio Murguia MRN: [id number] :1945 You are allergic to the following No active allergies Recent Documentation Height Weight BMI OB Status Smoking Status 1.575 m 68 kg 27.44 kg/m2 Hysterectomy Never Smoker Emergency Contacts Name Discharge Info Relation Home Work Mobile Christy Mitchell DISCHARGE CAREGIVER [3] Daughter [21]   [phone] [facility] DISCHARGE CAREGIVER [3] Son [22] [phone] About your hospitalization You were admitted on:  2017 You last received care in the:  [facility] PHASE 2 RECOVERY You were discharged on:  2017 Unit phone number:  [phone] Why you were hospitalized Your primary diagnosis was:  Not on File Providers Seen During Your Hospitalizations Provider Role Specialty Primary office phone Quang Dalton MD Attending Provider Orthopedic Surgery [phone] Your Primary Care Physician (PCP) Primary Care Physician Office Phone Office Fax Sandor Rainey, [address] [facility] [phone] Follow-up Information Follow up With Details Comments Contact Info Felicity Marinelli NP   [address] [facility] [address] [place] [id number] [phone] Current Discharge Medication List  
  
START taking these medications Dose & Instructions Dispensing Information Comments Morning Noon Evening Bedtime  
 oxyCODONE-acetaminophen 5-325 mg per tablet Commonly known as:  PERCOCET Your last dose was: Your next dose is:    
   
   
 Dose:  1-2 Tab Take 1-2 Tabs by mouth every four (4) hours as needed for Pain. Max Daily Amount: 12 Tabs. Quantity:  50 Tab Refills:  0 CONTINUE these medications which have NOT CHANGED Dose & Instructions Dispensing Information Comments Morning Noon Evening Bedtime  
 aspirin 81 mg tablet Your last dose was: Your next dose is:    
   
   
 Dose:  81 mg Take 81 mg by mouth. Refills:  0  
     
   
   
   
  
 calcium 600 mg Cap Your last dose was: Your next dose is: Take  by mouth. Refills:  0 Cholecalciferol (Vitamin D3) 50,000 unit Cap Your last dose was: Your next dose is:    
   
   
 Dose:  1 Cap Take 1 Cap by mouth every seven (7) days. Refills:  0  
     
   
   
   
  
 cyanocobalamin 100 mcg tablet Commonly known as:  VITAMIN B12 Your last dose was: Your next dose is:    
   
   
 Dose:  100 mcg Take 100 mcg by mouth daily. Refills:  0  
     
   
   
   
  
 fluticasone 50 mcg/actuation nasal spray Commonly known as:  [place] Mer[place] Your last dose was: Your next dose is:    
   
   
 Dose:  1 Spray 1 [place] by Both Nostrils route as needed. Refills:  0  
     
   
   
   
  
 metoprolol succinate 25 mg XL tablet Commonly known as:  TOPROL-XL Your last dose was: Your next dose is:    
   
   
 Dose:  25 mg Take 1 Tab by mouth daily. Quantity:  30 Tab Refills:  1  
     
   
   
   
  
 omega 3-dha-epa-fish oil 100-160-1,000 mg Cap Your last dose was: Your next dose is: Take  by mouth. Refills:  0 Where to Get Your Medications Information on where to get these meds will be given to you by the nurse or doctor. ! Ask your nurse or doctor about these medications  
  oxyCODONE-acetaminophen 5-325 mg per tablet Discharge Instructions DISCHARGE SUMMARY from Nurse The following personal items are in your possession at time of discharge: 
 
Dental Appliances: Lowers, Uppers Visual Aid: Glasses Personal Items Sent to Safe: medications, celllphone, $10.00, sleeper, shirt, sweatshirt, underwear, bra, sunglasses, eyeglasses, I backpack,. PATIENT INSTRUCTIONS: 
 
After general anesthesia or intravenous sedation, for 24 hours or while taking prescription Narcotics: · Limit your activities · Do not drive and operate hazardous machinery · Do not make important personal or business decisions · Do  not drink alcoholic beverages · If you have not urinated within 8 hours after discharge, please contact your surgeon on call. Report the following to your surgeon: 
· Excessive pain, swelling, redness or odor of or around the surgical area · Temperature over 100.5 · Nausea and vomiting lasting longer than 4 hours or if unable to take medications · Any signs of decreased circulation or nerve impairment to extremity: change in color, persistent  numbness, tingling, coldness or increase pain · Any questions What to do at Home: 
Recommended activity: Activity as tolerated. These are general instructions for a healthy lifestyle: No smoking/ No tobacco products/ Avoid exposure to second hand smoke Surgeon General's Warning:  Quitting smoking now greatly reduces serious risk to your health. Obesity, smoking, and sedentary lifestyle greatly increases your risk for illness A healthy diet, regular physical exercise & weight monitoring are important for maintaining a healthy lifestyle You may be retaining fluid if you have a history of heart failure or if you experience any of the following symptoms:  Weight gain of 3 pounds or more overnight or 5 pounds in a week, increased swelling in our hands or feet or shortness of breath while lying flat in bed. Please call your doctor as soon as you notice any of these symptoms; do not wait until your next office visit. Recognize signs and symptoms of STROKE: 
 
F-face looks uneven A-arms unable to move or move unevenly S-speech slurred or non-existent T-time-call 911 as soon as signs and symptoms begin-DO NOT go Back to bed or wait to see if you get better-TIME IS BRAIN. Warning Signs of HEART ATTACK Call 911 if you have these symptoms: 
? Chest discomfort. Most heart attacks involve discomfort in the center of the chest that lasts more than a few minutes, or that goes away and comes back. It can feel like uncomfortable pressure, squeezing, fullness, or pain. ? Discomfort in other areas of the upper body. Symptoms can include pain or discomfort in one or both arms, the back, neck, jaw, or stomach. ? Shortness of breath with or without chest discomfort. ? Other signs may include breaking out in a cold sweat, nausea, or lightheadedness. Don't wait more than five minutes to call [address]! Fast action can save your life. Calling 911 is almost always the fastest way to get lifesaving treatment. Emergency Medical Services staff can begin treatment when they arrive  up to an hour sooner than if someone gets to the hospital by car. The discharge information has been reviewed with the patient. The patient verbalized understanding. Discharge medications reviewed with the patient and appropriate educational materials and side effects teaching were provided. Patient armband removed and given to patient to take home. Patient was informed of the privacy risks if armband lost or stolen Discharge Orders None Introducing [facility]! [facility] introduces [employer]do patient portal. Now you can access parts of your medical record, email your doctor's office, and request medication refills online. 1. In your internet browser, go to [email or web address] 2. Click on the First Time User? Click Here link in the Sign In box. You will see the New Member Sign Up page. 3. Enter your Naldo Access Code exactly as it appears below.  You will not need to use this code after youve completed the sign-up process. If you do not sign up before the expiration date, you must request a new code. · [employer] Access Code: [email or web address] Expires: 11/19/2017 10:11 AM 
 
4. Enter the last four digits of your Social Security Number (xxxx) and Date of Birth (mm/dd/yyyy) as indicated and click Submit. You will be taken to the next sign-up page. 5. Create a [employer] ID. This will be your [employer] login ID and cannot be changed, so think of one that is secure and easy to remember. 6. Create a [employer] password. You can change your password at any time. 7. Enter your Password Reset Question and Answer. This can be used at a later time if you forget your password. 8. Enter your e-mail address. You will receive e-mail notification when new information is available in [address] 9. Click Sign Up. You can now view and download portions of your medical record. 10. Click the Download Summary menu link to download a portable copy of your medical information. If you have questions, please visit the Frequently Asked Questions section of the [employer] website. Remember, [employer] is NOT to be used for urgent needs. For medical emergencies, dial 911. Now available from your iPhone and Android! General Information Please provide this summary of care documentation to your next provider. Patient Signature:  ____________________________________________________________ Date:  ____________________________________________________________  
  
[facility] Provider Signature:  ____________________________________________________________ Date:  ____________________________________________________________

## 2017-10-02 NOTE — H&P
Surgery History and Physical    Subjective:      Didier Hercules is a 66 y.o.  female who presents with Right Shoulder Pain, RC tear. Patient Active Problem List    Diagnosis Date Noted    Preoperative clearance 08/31/2017    PAF (paroxysmal atrial fibrillation) (HCC) 24/67/4143    Diastolic dysfunction 03/94/5325    Palpitations 08/31/2017    Carotid disease, bilateral ([address] 08/31/2017     Past Medical History:   Diagnosis Date    Atrial fibrillation (HCC)     GERD (gastroesophageal reflux disease)       Past Surgical History:   Procedure Laterality Date    HX APPENDECTOMY      HX GYN      partial hysterectomy    HX ORTHOPAEDIC      knee arthroscopic, Bunionectomy    HX ORTHOPAEDIC      Bunionectomy     HX OTHER SURGICAL      Tumor removed from left shoulder blade    HX ROTATOR CUFF REPAIR Right       Social History   Substance Use Topics    Smoking status: Never Smoker    Smokeless tobacco: Never Used    Alcohol use No      History reviewed. No pertinent family history. Prior to Admission medications    Medication Sig Start Date End Date Taking? Authorizing Provider   fluticasone (FLONASE) 50 mcg/actuation nasal spray 1 [place] by Both Nostrils route as needed. 7/14/17  Yes Historical Provider   metoprolol succinate (TOPROL-XL) 25 mg XL tablet Take 1 Tab by mouth daily. 8/21/17  Yes Donny Panda MD   cyanocobalamin (VITAMIN B12) 100 mcg tablet Take 100 mcg by mouth daily. Historical Provider   Cholecalciferol, Vitamin D3, 50,000 unit cap Take 1 Cap by mouth every seven (7) days. Historical Provider   aspirin 81 mg tablet Take 81 mg by mouth. Historical Provider   omega 3-dha-epa-fish oil 100-160-1,000 mg cap Take  by mouth. Historical Provider   calcium 600 mg cap Take  by mouth.     Historical Provider     No Known Allergies      Review of Systems:    A comprehensive review of systems was negative except for that written in the History of Present Illness. Objective:     Patient Vitals for the past 8 hrs:   BP Temp Pulse Resp SpO2 Height Weight   10/02/17 0836 157/77 97 °F (36.1 °C) 81 15 97 % 5' 2\" (1.575 m) 68 kg (150 lb)       Temp (24hrs), Av °F (36.1 °C), Min:97 °F (36.1 °C), Max:97 °F (36.1 °C)      Physical Exam:  GENERAL: alert, cooperative, no distress, appears stated age, LUNG: clear to auscultation bilaterally, HEART: WNL, click, rub or gallop, EXTREMITIES:  extremities normal, atraumatic, no cyanosis or edema, Right shoulder with Pain and weakness, NVI    Labs: Reviewed from pre-op clearance    Data Review:  per anesthesia    Assessment:     Right Shoulder RC tear    Plan:     Right Shoulder scope with RC repair. Risks and benefits discussed  Consent obtained.      Signed By: Stephanie Win MD     2017

## 2017-10-02 NOTE — DISCHARGE INSTRUCTIONS
DISCHARGE SUMMARY from Nurse    The following personal items are in your possession at time of discharge:    Dental Appliances: Lowers, Uppers  Visual Aid: Glasses                 Personal Items Sent to Safe: medications, celllphone, $10.00, sleeper, shirt, sweatshirt, underwear, bra, sunglasses, eyeglasses, I backpack,. PATIENT INSTRUCTIONS:    After general anesthesia or intravenous sedation, for 24 hours or while taking prescription Narcotics:  · Limit your activities  · Do not drive and operate hazardous machinery  · Do not make important personal or business decisions  · Do  not drink alcoholic beverages  · If you have not urinated within 8 hours after discharge, please contact your surgeon on call. Report the following to your surgeon:  · Excessive pain, swelling, redness or odor of or around the surgical area  · Temperature over 100.5  · Nausea and vomiting lasting longer than 4 hours or if unable to take medications  · Any signs of decreased circulation or nerve impairment to extremity: change in color, persistent  numbness, tingling, coldness or increase pain  · Any questions        What to do at Home:  Recommended activity: Activity as tolerated. These are general instructions for a healthy lifestyle:    No smoking/ No tobacco products/ Avoid exposure to second hand smoke    Surgeon General's Warning:  Quitting smoking now greatly reduces serious risk to your health. Obesity, smoking, and sedentary lifestyle greatly increases your risk for illness    A healthy diet, regular physical exercise & weight monitoring are important for maintaining a healthy lifestyle    You may be retaining fluid if you have a history of heart failure or if you experience any of the following symptoms:  Weight gain of 3 pounds or more overnight or 5 pounds in a week, increased swelling in our hands or feet or shortness of breath while lying flat in bed.   Please call your doctor as soon as you notice any of these symptoms; do not wait until your next office visit. Recognize signs and symptoms of STROKE:    F-face looks uneven    A-arms unable to move or move unevenly    S-speech slurred or non-existent    T-time-call 911 as soon as signs and symptoms begin-DO NOT go       Back to bed or wait to see if you get better-TIME IS BRAIN. Warning Signs of HEART ATTACK     Call 911 if you have these symptoms:   Chest discomfort. Most heart attacks involve discomfort in the center of the chest that lasts more than a few minutes, or that goes away and comes back. It can feel like uncomfortable pressure, squeezing, fullness, or pain.  Discomfort in other areas of the upper body. Symptoms can include pain or discomfort in one or both arms, the back, neck, jaw, or stomach.  Shortness of breath with or without chest discomfort.  Other signs may include breaking out in a cold sweat, nausea, or lightheadedness. Don't wait more than five minutes to call 911 - MINUTES MATTER! Fast action can save your life. Calling 911 is almost always the fastest way to get lifesaving treatment. Emergency Medical Services staff can begin treatment when they arrive -- up to an hour sooner than if someone gets to the hospital by car. The discharge information has been reviewed with the patient. The patient verbalized understanding. Discharge medications reviewed with the patient and appropriate educational materials and side effects teaching were provided. Patient armband removed and given to patient to take home.   Patient was informed of the privacy risks if armband lost or stolen

## 2017-10-02 NOTE — ANESTHESIA PROCEDURE NOTES
Peripheral Block    Start time: 10/2/2017 8:02 AM  End time: 10/2/2017 9:00 AM  Performed by: Live Ness by: Manav Webb       Pre-procedure: Indications: at surgeon's request, post-op pain management and procedure for pain    Preanesthetic Checklist: patient identified, risks and benefits discussed, site marked, timeout performed, anesthesia consent given and patient being monitored      Block Type:   Block Type:  Brachial plexus and interscalene  Laterality:  Right  Monitoring:  Standard ASA monitoring, continuous pulse ox, frequent vital sign checks, oxygen, responsive to questions and heart rate  Injection Technique:  Single shot  Procedures: ultrasound guided and nerve stimulator    Patient Position: seated  Prep: chlorhexidine    Location:  Interscalene  Needle Type:  Stimuplex  Needle Gauge:  22 G  Needle Localization:  Ultrasound guidance and nerve stimulator  Medication Injected:  0.5%  ropivacaine  Volume (mL):  25    Assessment:  Number of attempts:  1  Injection Assessment:  No intravascular symptoms, negative aspiration for blood, local visualized surrounding nerve on ultrasound, ultrasound image on chart, no paresthesia and incremental injection every 5 mL  Patient tolerance:  Patient tolerated the procedure well with no immediate complications  Location:  PREOP HOLDING    Patient given 2 mg IV Versed and 100 mcg IV Fentanyl for sedation.     10/2/2017     9:03 AM     Demian Vuong MD

## 2017-10-02 NOTE — IP AVS SNAPSHOT
[facility] 
 
 
 [address] 
[phone] Patient: Ignacio Fraser MRN: [id number] :1945 Current Discharge Medication List  
  
START taking these medications Dose & Instructions Dispensing Information Comments Morning Noon Evening Bedtime  
 oxyCODONE-acetaminophen 5-325 mg per tablet Commonly known as:  PERCOCET Your last dose was: Your next dose is:    
   
   
 Dose:  1-2 Tab Take 1-2 Tabs by mouth every four (4) hours as needed for Pain. Max Daily Amount: 12 Tabs. Quantity:  50 Tab Refills:  0 CONTINUE these medications which have NOT CHANGED Dose & Instructions Dispensing Information Comments Morning Noon Evening Bedtime  
 aspirin 81 mg tablet Your last dose was: Your next dose is:    
   
   
 Dose:  81 mg Take 81 mg by mouth. Refills:  0  
     
   
   
   
  
 calcium 600 mg Cap Your last dose was: Your next dose is: Take  by mouth. Refills:  0 Cholecalciferol (Vitamin D3) 50,000 unit Cap Your last dose was: Your next dose is:    
   
   
 Dose:  1 Cap Take 1 Cap by mouth every seven (7) days. Refills:  0  
     
   
   
   
  
 cyanocobalamin 100 mcg tablet Commonly known as:  VITAMIN B12 Your last dose was: Your next dose is:    
   
   
 Dose:  100 mcg Take 100 mcg by mouth daily. Refills:  0  
     
   
   
   
  
 fluticasone 50 mcg/actuation nasal spray Commonly known as:  Leandro Napoles Your last dose was: Your next dose is:    
   
   
 Dose:  1 Spray 1 [place] by Both Nostrils route as needed. Refills:  0  
     
   
   
   
  
 metoprolol succinate 25 mg XL tablet Commonly known as:  TOPROL-XL Your last dose was: Your next dose is:    
   
   
 Dose:  25 mg Take 1 Tab by mouth daily. Quantity:  30 Tab Refills:  1  
     
   
   
   
  
 omega 3-dha-epa-fish oil 100-160-1,000 mg Cap Your last dose was: Your next dose is: Take  by mouth. Refills:  0 Where to Get Your Medications Information on where to get these meds will be given to you by the nurse or doctor. ! Ask your nurse or doctor about these medications  
  oxyCODONE-acetaminophen 5-325 mg per tablet

## 2017-10-02 NOTE — BRIEF OP NOTE
BRIEF OPERATIVE NOTE    Date of Procedure: 10/2/2017   Preoperative Diagnosis: right shoulder rotator cuff tear impingement ac djd  m75.121,m75.91,m19.90  Postoperative Diagnosis: right shoulder rotator cuff tear with bursitis and Deltoid Muscle Fascial defect      Procedure(s):  right SHOULDER ARTHROSCOPY open  ROTATOR CUFF REPAIR,Bursectomy and Repair of Chronic deltoid fascial tear. PRP and [place]  Surgeon(s) and Role:     * Salas Mienr MD - Primary         Assistant Staff:       Surgical Staff:  Circ-1: Geetha Heck RN  Circ-Relief: Viviana Hamilton  Scrub Tech-1: Chantal Salazar  Surg Asst-1: Yogi Severs  Event Time In   Incision Start 0219   Incision Close      Anesthesia: General and block  Estimated Blood Loss: minimal  Specimens:none   Findings: rc tear, deltoid tear 680312  Complications: none  Implants:   Implant Name Type Inv.  Item Serial No.  Lot No. LRB No. Used Action   ANCHOR SUTURE HELIX TI 4.5MM BR3 - [id number] [place] ANCHOR SUTURE HELIX TI 4.5MM BR3  [employer]  Right 1 Implanted   Helix Advance     [id number] Right 1 Implanted   GRAFT TEND WRAP [id number] CLLGN -- [place]   GRAFT TEND WRAP [id number] CLLGN -- [place]   [employer] [id number] Right 1 Implanted

## 2017-10-02 NOTE — ANESTHESIA POSTPROCEDURE EVALUATION
Post-Anesthesia Evaluation & Assessment    Visit Vitals    /78    Pulse 79    Temp 36.1 °C (97 °F)    Resp 22    Ht 5' 2\" (1.575 m)    Wt 68 kg (150 lb)    SpO2 98%    BMI 27.44 kg/m2       Nausea/Vomiting: no nausea    Pain score (VAS): 2    Post-operative hydration adequate.     Mental status & Level of consciousness: orientation per pre-anesthetic level    Neurological status: moves all extremities, sensation grossly intact    Pulmonary status: airway patent, no supplemental oxygen required    Complications related to anesthesia: none    Additional comments:        Ronal Arora CRNA  October 2, 2017

## 2017-10-02 NOTE — ANESTHESIA PREPROCEDURE EVALUATION
Anesthetic History   No history of anesthetic complications            Review of Systems / Medical History  Patient summary reviewed and pertinent labs reviewed    Pulmonary  Within defined limits                 Neuro/Psych   Within defined limits           Cardiovascular            Dysrhythmias : atrial fibrillation           GI/Hepatic/Renal     GERD: well controlled           Endo/Other  Within defined limits           Other Findings   Comments:   Risk Factors for Postoperative nausea/vomiting:       History of postoperative nausea/vomiting? NO       Female? YES       Motion sickness? NO       Intended opioid administration for postoperative analgesia? NO      Smoking Abstinence  Current Smoker? NO  Elective Surgery? YES  Seen preoperatively by anesthesiologist or proxy prior to day of surgery? YES  Pt abstained from smoking 24 hours prior to anesthesia?  N/A           Physical Exam    Airway  Mallampati: II  TM Distance: 4 - 6 cm  Neck ROM: normal range of motion   Mouth opening: Normal     Cardiovascular               Dental    Dentition: Edentulous     Pulmonary                 Abdominal  GI exam deferred       Other Findings            Anesthetic Plan    ASA: 3  Anesthesia type: general      Post-op pain plan if not by surgeon: peripheral nerve block single    Induction: Intravenous  Anesthetic plan and risks discussed with: Patient      IS block explained;

## 2017-10-12 ENCOUNTER — TELEPHONE (OUTPATIENT)
Dept: CARDIOLOGY CLINIC | Age: 72
End: 2017-10-12

## 2017-10-12 NOTE — TELEPHONE ENCOUNTER
Pt called office to make follow up appt with Dr. Jaleel Barron and to advise that she as noticed swelling in her feet and ankles since her shoulder surgery earlier this month. Pt has follow up on Monday 10/16/17 with Dr. Jaleel Barron. Pt advised that swelling goes down when feet are propped up. Pt had no other complaints at this time. I advised pt that I will advise Dr. Jaleel Barron of above message and if any further instructions prior to follow up then I will call her back. Pt verbalized understanding and agrees with plan at this time.

## 2017-10-16 ENCOUNTER — OFFICE VISIT (OUTPATIENT)
Dept: CARDIOLOGY CLINIC | Age: 72
End: 2017-10-16

## 2017-10-16 VITALS
OXYGEN SATURATION: 98 % | HEART RATE: 88 BPM | WEIGHT: 151 LBS | DIASTOLIC BLOOD PRESSURE: 88 MMHG | BODY MASS INDEX: 27.79 KG/M2 | HEIGHT: 62 IN | SYSTOLIC BLOOD PRESSURE: 159 MMHG

## 2017-10-16 DIAGNOSIS — R00.2 PALPITATIONS: ICD-10-CM

## 2017-10-16 DIAGNOSIS — I77.9 CAROTID DISEASE, BILATERAL (HCC): Primary | ICD-10-CM

## 2017-10-16 DIAGNOSIS — I48.0 PAF (PAROXYSMAL ATRIAL FIBRILLATION) (HCC): ICD-10-CM

## 2017-10-16 DIAGNOSIS — I51.89 DIASTOLIC DYSFUNCTION: ICD-10-CM

## 2017-10-16 RX ORDER — TRAMADOL HYDROCHLORIDE 50 MG/1
50 TABLET ORAL
Qty: 12 TAB | Refills: 0 | Status: SHIPPED | OUTPATIENT
Start: 2017-10-16 | End: 2018-01-03 | Stop reason: ALTCHOICE

## 2017-10-16 RX ORDER — METOPROLOL SUCCINATE 25 MG/1
25 TABLET, EXTENDED RELEASE ORAL DAILY
Qty: 90 TAB | Refills: 3 | Status: SHIPPED | OUTPATIENT
Start: 2017-10-16 | End: 2018-01-23 | Stop reason: SDUPTHER

## 2017-10-16 NOTE — PROGRESS NOTES
1. Have you been to the ER, urgent care clinic since your last visit? Hospitalized since your last visit? Yes, [facility] for shoulder surgery     2. Have you seen or consulted any other health care providers outside of the [address] [place] [address] since your last visit? Include any pap smears or colon screening.  No

## 2017-10-16 NOTE — MR AVS SNAPSHOT
Visit Information Date & Time Provider Department Dept. Phone Encounter #  
 10/16/2017 10:30 AM Renee Medina MD [address] Specialist at [facility] [phone] [id number] Your Appointments 12/15/2017  9:30 AM  
Follow Up with Renee Medina MD  
Cardio Specialist at [facility] [address] [place] [address]) Appt Note: 2 months [facility] [address] [facility] [address] Upcoming Health Maintenance Date Due DTaP/Tdap/Td series (1 - Tdap) 11/12/1965 FOBT Q 1 YEAR AGE 50-75 11/12/1994 ZOSTER VACCINE AGE 60> 9/12/2004 GLAUCOMA SCREENING Q2Y 11/12/2009 Pneumococcal 65+ Low/Medium Risk (1 of 2 - PCV13) 11/12/2009 MEDICARE YEARLY EXAM 11/12/2009 BREAST CANCER SCRN MAMMOGRAM 6/20/2013 INFLUENZA AGE 9 TO ADULT 8/1/2017 Allergies as of 10/16/2017  Review Complete On: 10/2/2017 By: Tarsha Chou RN No Known Allergies Current Immunizations  Never Reviewed No immunizations on file. Not reviewed this visit Vitals BP Pulse Height(growth percentile) Weight(growth percentile) SpO2 BMI  
 159/88 88 5' 2\" (1.575 m) 151 lb (68.5 kg) 98% 27.62 kg/m2 OB Status Smoking Status Hysterectomy Never Smoker Vitals History BMI and BSA Data Body Mass Index Body Surface Area  
 27.62 kg/m 2 1.73 m 2 Preferred Pharmacy Pharmacy Name Phone [address] [place] [address] [phone] Your Updated Medication List  
  
   
This list is accurate as of: 10/16/17 10:30 AM.  Always use your most recent med list.  
  
  
  
  
 aspirin 81 mg tablet Take 81 mg by mouth.  
  
 calcium 600 mg Cap Take  by mouth. Cholecalciferol (Vitamin D3) 50,000 unit Cap Take 1 Cap by mouth every seven (7) days. cyanocobalamin 100 mcg tablet Commonly known as:  VITAMIN B12  
 Take 100 mcg by mouth daily. fluticasone 50 mcg/actuation nasal spray Commonly known as:  FLONASE  
1 [place] by Both Nostrils route as needed. metoprolol succinate 25 mg XL tablet Commonly known as:  TOPROL-XL Take 1 Tab by mouth daily. omega 3-dha-epa-fish oil 100-160-1,000 mg Cap Take  by mouth. traMADol 50 mg tablet Commonly known as:  ULTRAM  
Take 1 Tab by mouth every six (6) hours as needed for Pain. Max Daily Amount: 200 mg. Prescriptions Printed Refills  
 traMADol (ULTRAM) 50 mg tablet 0 Sig: Take 1 Tab by mouth every six (6) hours as needed for Pain. Max Daily Amount: 200 mg. Class: Print Route: Oral  
  
Prescriptions Sent to Pharmacy Refills  
 metoprolol succinate (TOPROL-XL) 25 mg XL tablet 3 Sig: Take 1 Tab by mouth daily. Class: Normal  
 Pharmacy: [address] [place] [address] [address], [address] [place] [address].  #: [phone] Route: Oral  
  
Introducing [facility] SERVICES! Cherie Mackenzie introduces [employer] patient portal. Now you can access parts of your medical record, email your doctor's office, and request medication refills online. 1. In your internet browser, go to [email or web address] 2. Click on the First Time User? Click Here link in the Sign In box. You will see the New Member Sign Up page. 3. Enter your [employer] Access Code exactly as it appears below. You will not need to use this code after youve completed the sign-up process. If you do not sign up before the expiration date, you must request a new code. · [employer] Access Code: [email or web address] Expires: 11/19/2017 10:11 AM 
 
4. Enter the last four digits of your Social Security Number (xxxx) and Date of Birth (mm/dd/yyyy) as indicated and click Submit. You will be taken to the next sign-up page. 5. Create a [employer] ID. This will be your [employer] login ID and cannot be changed, so think of one that is secure and easy to remember. 6. Create a [employer] password. You can change your password at any time. 7. Enter your Password Reset Question and Answer. This can be used at a later time if you forget your password. 8. Enter your e-mail address. You will receive e-mail notification when new information is available in [address] 9. Click Sign Up. You can now view and download portions of your medical record. 10. Click the Download Summary menu link to download a portable copy of your medical information. If you have questions, please visit the Frequently Asked Questions section of the [employer] website. Remember, [employer] is NOT to be used for urgent needs. For medical emergencies, dial 911. Now available from your iPhone and Android! Please provide this summary of care documentation to your next provider. Your primary care clinician is listed as Ryan Roman. If you have any questions after today's visit, please call [phone].

## 2017-10-23 NOTE — PROGRESS NOTES
Subjective:      Kacey German is in the office today for cardiac reevaluation. She is a 66year-old woman that was seen [facility]. At that time, she was about to have rotator cuff surgery. She developed atrial fibrillation while in the presurgical area. A short time later, she did spontaneously convert to sinus rhythm. She was given a prescription for low dose beta-blocker in the form of Toprol XL 25 mg a day. In the office today, she says she is doing well. She is recovering from her recent shoulder surgery. She has had no palpitations. She has had no chest pain. She has had no limiting shortness of breath, PND or orthopnea. She has had no near syncope or syncope. Her calculated CHADS2- VASc score was 3 with a 3.2% incidence of stroke per year without anticoagulation. She was unable to take the oxycodone. She has been taking tylenol which has only minimal effect. She will be seeing her Orthopedic surgeon in several days. Patient Active Problem List    Diagnosis Date Noted    Preoperative clearance 08/31/2017    PAF (paroxysmal atrial fibrillation) ([facility]) 48/69/0492    Diastolic dysfunction 56/45/7135    Palpitations 08/31/2017    Carotid disease, bilateral ([facility] [address] 08/31/2017     Current Outpatient Prescriptions   Medication Sig Dispense Refill    metoprolol succinate (TOPROL-XL) 25 mg XL tablet Take 1 Tab by mouth daily. 90 Tab 3    traMADol (ULTRAM) 50 mg tablet Take 1 Tab by mouth every six (6) hours as needed for Pain. Max Daily Amount: 200 mg. 12 Tab 0    fluticasone (FLONASE) 50 mcg/actuation nasal spray 1 [place] by Both Nostrils route as needed.  cyanocobalamin (VITAMIN B12) 100 mcg tablet Take 100 mcg by mouth daily.  Cholecalciferol, Vitamin D3, 50,000 unit cap Take 1 Cap by mouth every seven (7) days.  aspirin 81 mg tablet Take 81 mg by mouth.  omega 3-dha-epa-fish oil 100-160-1,000 mg cap Take  by mouth.       calcium 600 mg cap Take  by mouth. No Known Allergies  Past Medical History:   Diagnosis Date    Atrial fibrillation (HCC)     GERD (gastroesophageal reflux disease)      Past Surgical History:   Procedure Laterality Date    HX APPENDECTOMY      HX GYN      partial hysterectomy    HX ORTHOPAEDIC      knee arthroscopic, Bunionectomy    HX ORTHOPAEDIC      Bunionectomy     HX OTHER SURGICAL      Tumor removed from left shoulder blade    HX ROTATOR CUFF REPAIR Right      No family history on file. History   Smoking Status    Never Smoker   Smokeless Tobacco    Never Used          Review of Systems, additional:  Constitutional: negative  Eyes: negative  Respiratory: negative  Cardiovascular: positive for palpitations  Gastrointestinal: negative  Musculoskeletal:negative  Neurological: negative  Behvioral/Psych: negative  Endocrine: negative  ENT: negative    Objective:     Visit Vitals    /88    Pulse 88    Ht 5' 2\" (1.575 m)    Wt 151 lb (68.5 kg)    SpO2 98%    BMI 27.62 kg/m2     General:  alert, cooperative, no distress   Chest Wall: inspection normal - no chest wall deformities or tenderness, respiratory effort normal   Lung: clear to auscultation bilaterally   Heart:  normal rate and regular rhythm, S1 and S2 normal, no murmurs noted, no gallops noted   Abdomen: soft, non-tender. Bowel sounds normal. No masses,  no organomegaly   Extremities: extremities normal, atraumatic, no cyanosis or edema Skin: no rashes   Neuro: alert, oriented, normal speech, no focal findings or movement disorder noted     EK2017; Sinus rhythm. Diffuse nondiagnostic ST & T wave abnormalities    Assessment/Plan:       ICD-10-CM ICD-9-CM    1. Atrial fibrillation, unspecified type ([address] seems to be maintaining sinus rhythm since initial in hospital episode I48.91 427.31 AMB POC EKG ROUTINE W/ 12 LEADS, INTER & REP   2. PAF (paroxysmal atrial fibrillation) ([facility]) I48.0 427.31    3.  Diastolic dysfunction, recent echo 4/94/0626; normal systolic function. Grade 1 diastolic dysfunction. Mild LAE. I51.9 429.9    4. Palpitations, none recently R00.2 785.1    5. Preoperative clearance, patient had her shoulder surgery and starts rehab next week. Z01.818 V72.84    6.  Carotid disease, bilateral (HCC) I77.9 447.9

## 2017-12-01 RX ORDER — LIDOCAINE 50 MG/G
1 PATCH TOPICAL AS NEEDED
COMMUNITY
Start: 2017-09-22

## 2017-12-04 ENCOUNTER — ANESTHESIA EVENT (OUTPATIENT)
Dept: ENDOSCOPY | Age: 72
End: 2017-12-04
Payer: MEDICARE

## 2017-12-05 ENCOUNTER — ANESTHESIA (OUTPATIENT)
Dept: ENDOSCOPY | Age: 72
End: 2017-12-05
Payer: MEDICARE

## 2017-12-05 ENCOUNTER — HOSPITAL ENCOUNTER (OUTPATIENT)
Age: 72
Setting detail: OUTPATIENT SURGERY
Discharge: HOME | End: 2017-12-05
Attending: INTERNAL MEDICINE | Admitting: INTERNAL MEDICINE
Payer: MEDICARE

## 2017-12-05 VITALS
BODY MASS INDEX: 25.75 KG/M2 | HEIGHT: 63 IN | SYSTOLIC BLOOD PRESSURE: 122 MMHG | OXYGEN SATURATION: 100 % | WEIGHT: 145.31 LBS | DIASTOLIC BLOOD PRESSURE: 61 MMHG | HEART RATE: 97 BPM | TEMPERATURE: 97.9 F | RESPIRATION RATE: 16 BRPM

## 2017-12-05 PROBLEM — D3A.092 CARCINOID TUMOR OF STOMACH: Status: ACTIVE | Noted: 2017-12-05

## 2017-12-05 PROCEDURE — 74011250636 HC RX REV CODE- 250/636

## 2017-12-05 PROCEDURE — 74011250636 HC RX REV CODE- 250/636: Performed by: NURSE ANESTHETIST, CERTIFIED REGISTERED

## 2017-12-05 PROCEDURE — 77030009426 HC FCPS BIOP ENDOSC BSC -B: Performed by: INTERNAL MEDICINE

## 2017-12-05 PROCEDURE — 88305 TISSUE EXAM BY PATHOLOGIST: CPT | Performed by: INTERNAL MEDICINE

## 2017-12-05 PROCEDURE — 74011000250 HC RX REV CODE- 250

## 2017-12-05 PROCEDURE — 76060000031 HC ANESTHESIA FIRST 0.5 HR: Performed by: INTERNAL MEDICINE

## 2017-12-05 PROCEDURE — 76040000019: Performed by: INTERNAL MEDICINE

## 2017-12-05 RX ORDER — PROPOFOL 10 MG/ML
INJECTION, EMULSION INTRAVENOUS AS NEEDED
Status: DISCONTINUED | OUTPATIENT
Start: 2017-12-05 | End: 2017-12-05 | Stop reason: HOSPADM

## 2017-12-05 RX ORDER — FAMOTIDINE 20 MG/1
20 TABLET, FILM COATED ORAL ONCE
Status: DISCONTINUED | OUTPATIENT
Start: 2017-12-05 | End: 2017-12-05 | Stop reason: HOSPADM

## 2017-12-05 RX ORDER — LIDOCAINE HYDROCHLORIDE 20 MG/ML
INJECTION, SOLUTION EPIDURAL; INFILTRATION; INTRACAUDAL; PERINEURAL AS NEEDED
Status: DISCONTINUED | OUTPATIENT
Start: 2017-12-05 | End: 2017-12-05 | Stop reason: HOSPADM

## 2017-12-05 RX ORDER — SODIUM CHLORIDE 0.9 % (FLUSH) 0.9 %
5-10 SYRINGE (ML) INJECTION AS NEEDED
Status: CANCELLED | OUTPATIENT
Start: 2017-12-05 | End: 2017-12-05

## 2017-12-05 RX ORDER — SODIUM CHLORIDE, SODIUM LACTATE, POTASSIUM CHLORIDE, CALCIUM CHLORIDE 600; 310; 30; 20 MG/100ML; MG/100ML; MG/100ML; MG/100ML
25 INJECTION, SOLUTION INTRAVENOUS CONTINUOUS
Status: DISCONTINUED | OUTPATIENT
Start: 2017-12-05 | End: 2017-12-05 | Stop reason: HOSPADM

## 2017-12-05 RX ORDER — SODIUM CHLORIDE 9 MG/ML
25 INJECTION, SOLUTION INTRAVENOUS CONTINUOUS
Status: CANCELLED | OUTPATIENT
Start: 2017-12-05 | End: 2017-12-05

## 2017-12-05 RX ORDER — SODIUM CHLORIDE 0.9 % (FLUSH) 0.9 %
5-10 SYRINGE (ML) INJECTION EVERY 8 HOURS
Status: CANCELLED | OUTPATIENT
Start: 2017-12-05 | End: 2017-12-05

## 2017-12-05 RX ORDER — ONDANSETRON 2 MG/ML
4 INJECTION INTRAMUSCULAR; INTRAVENOUS ONCE
Status: CANCELLED | OUTPATIENT
Start: 2017-12-05 | End: 2017-12-05

## 2017-12-05 RX ORDER — SODIUM CHLORIDE, SODIUM LACTATE, POTASSIUM CHLORIDE, CALCIUM CHLORIDE 600; 310; 30; 20 MG/100ML; MG/100ML; MG/100ML; MG/100ML
25 INJECTION, SOLUTION INTRAVENOUS CONTINUOUS
Status: CANCELLED | OUTPATIENT
Start: 2017-12-05

## 2017-12-05 RX ADMIN — PROPOFOL 50 MG: 10 INJECTION, EMULSION INTRAVENOUS at 09:19

## 2017-12-05 RX ADMIN — PROPOFOL 60 MG: 10 INJECTION, EMULSION INTRAVENOUS at 09:18

## 2017-12-05 RX ADMIN — SODIUM CHLORIDE, SODIUM LACTATE, POTASSIUM CHLORIDE, AND CALCIUM CHLORIDE: 600; 310; 30; 20 INJECTION, SOLUTION INTRAVENOUS at 09:06

## 2017-12-05 RX ADMIN — LIDOCAINE HYDROCHLORIDE 50 MG: 20 INJECTION, SOLUTION EPIDURAL; INFILTRATION; INTRACAUDAL; PERINEURAL at 09:18

## 2017-12-05 NOTE — ANESTHESIA PREPROCEDURE EVALUATION
Anesthetic History   No history of anesthetic complications            Review of Systems / Medical History  Patient summary reviewed and pertinent labs reviewed    Pulmonary  Within defined limits                 Neuro/Psych   Within defined limits           Cardiovascular            Dysrhythmias   CAD    Exercise tolerance: >4 METS     GI/Hepatic/Renal     GERD           Endo/Other  Within defined limits           Other Findings   Comments:   Risk Factors for Postoperative nausea/vomiting:       History of postoperative nausea/vomiting? NO       Female? YES       Motion sickness? NO       Intended opioid administration for postoperative analgesia? NO      Smoking Abstinence  Current Smoker? NO  Elective Surgery? YES  Seen preoperatively by anesthesiologist or proxy prior to day of surgery? YES  Pt abstained from smoking 24 hours prior to anesthesia?  N/A         Physical Exam    Airway  Mallampati: II  TM Distance: 4 - 6 cm  Neck ROM: normal range of motion   Mouth opening: Normal     Cardiovascular  Regular rate and rhythm,  S1 and S2 normal,  no murmur, click, rub, or gallop  Rhythm: regular  Rate: normal         Dental    Dentition: Lower dentition intact and Upper dentition intact     Pulmonary  Breath sounds clear to auscultation               Abdominal  GI exam deferred       Other Findings            Anesthetic Plan    ASA: 3  Anesthesia type: MAC          Induction: Intravenous  Anesthetic plan and risks discussed with: Patient

## 2017-12-05 NOTE — IP AVS SNAPSHOT
[address] [place] [address] Adrianna Cartermelissa Mann 
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  
Where:  [facility]) Discharge Orders None A check marysol indicates which time of day the medication should be taken. My Medications TAKE these medications as instructed Instructions Each Dose to Equal  
 Morning Noon Evening Bedtime  
 aspirin 81 mg tablet Your last dose was: Your next dose is: Take 81 mg by mouth. 81 mg  
    
   
   
   
  
 calcium 600 mg Cap Your last dose was: Your next dose is: Take  by mouth. Cholecalciferol (Vitamin D3) 50,000 unit Cap Your last dose was: Your next dose is: Take 1 Cap by mouth every seven (7) days. 1 Cap  
    
   
   
   
  
 cyanocobalamin 100 mcg tablet Commonly known as:  VITAMIN B12 Your last dose was: Your next dose is: Take 100 mcg by mouth daily. 100 mcg  
    
   
   
   
  
 fluticasone 50 mcg/actuation nasal spray Commonly known as:  Moisés Metro Your last dose was: Your next dose is: 1 [place] by Both Nostrils route as needed. 1 Spray  
    
   
   
   
  
 lidocaine 5 % Commonly known as:  Laila Zuniga Your last dose was: Your next dose is:    
   
   
      
   
   
   
  
 metoprolol succinate 25 mg XL tablet Commonly known as:  TOPROL-XL Your last dose was: Your next dose is: Take 1 Tab by mouth daily. 25 mg  
    
   
   
   
  
 omega 3-dha-epa-fish oil 100-160-1,000 mg Cap Your last dose was: Your next dose is: Take  by mouth. traMADol 50 mg tablet Commonly known as:  ULTRAM  
   
Your last dose was: Your next dose is: Take 1 Tab by mouth every six (6) hours as needed for Pain. Max Daily Amount: 200 mg.  
 50 mg Discharge Instructions DISCHARGE SUMMARY from Nurse PATIENT INSTRUCTIONS: 
 
After general anesthesia or intravenous sedation, for 24 hours or while taking prescription Narcotics: · Limit your activities · Do not drive and operate hazardous machinery · Do not make important personal or business decisions · Do  not drink alcoholic beverages · If you have not urinated within 8 hours after discharge, please contact your surgeon on call. Report the following to your surgeon: 
· Excessive pain, swelling, redness or odor of or around the surgical area · Temperature over 100.5 · Nausea and vomiting lasting longer than 4 hours or if unable to take medications · Any signs of decreased circulation or nerve impairment to extremity: change in color, persistent  numbness, tingling, coldness or increase pain · Any questions What to do at Home: No smoking/ No tobacco products/ Avoid exposure to second hand smoke Surgeon General's Warning:  Quitting smoking now greatly reduces serious risk to your health. Obesity, smoking, and sedentary lifestyle greatly increases your risk for illness A healthy diet, regular physical exercise & weight monitoring are important for maintaining a healthy lifestyle You may be retaining fluid if you have a history of heart failure or if you experience any of the following symptoms:  Weight gain of 3 pounds or more overnight or 5 pounds in a week, increased swelling in our hands or feet or shortness of breath while lying flat in bed. Please call your doctor as soon as you notice any of these symptoms; do not wait until your next office visit. Recognize signs and symptoms of STROKE: 
 
F-face looks uneven A-arms unable to move or move unevenly S-speech slurred or non-existent T-time-call 911 as soon as signs and symptoms begin-DO NOT go Back to bed or wait to see if you get better-TIME IS BRAIN. Warning Signs of HEART ATTACK Call 911 if you have these symptoms: 
? Chest discomfort. Most heart attacks involve discomfort in the center of the chest that lasts more than a few minutes, or that goes away and comes back. It can feel like uncomfortable pressure, squeezing, fullness, or pain. ? Discomfort in other areas of the upper body. Symptoms can include pain or discomfort in one or both arms, the back, neck, jaw, or stomach. ? Shortness of breath with or without chest discomfort. ? Other signs may include breaking out in a cold sweat, nausea, or lightheadedness. Don't wait more than five minutes to call [address]! Fast action can save your life. Calling 911 is almost always the fastest way to get lifesaving treatment. Emergency Medical Services staff can begin treatment when they arrive  up to an hour sooner than if someone gets to the hospital by car. The discharge information has been reviewed with the patient. The patient verbalized understanding. Discharge medications reviewed with the patient and appropriate educational materials and side effects teaching were provided. ___________________________________________________________________________________________________________________________________ Upper GI Endoscopy: What to Expect at [facility] Your Recovery After you have an endoscopy, you will stay at the hospital or clinic for 1 to 2 hours. This will allow the medicine to wear off. You will be able to go home after your doctor or nurse checks to make sure you are not having any problems. You may have to stay overnight if you had treatment during the test. You may have a sore throat for a day or two after the test. 
This care sheet gives you a general idea about what to expect after the test. 
How can you care for yourself at home? Activity · Rest as much as you need to after you go home. · You should be able to go back to your usual activities the day after the test. 
Diet · Follow your doctor's directions for eating after the test. 
· Drink plenty of fluids (unless your doctor has told you not to). Medications · If you have a sore throat the day after the test, use an over-the-counter spray to numb your throat. Follow-up care is a key part of your treatment and safety. Be sure to make and go to all appointments, and call your doctor if you are having problems. It's also a good idea to know your test results and keep a list of the medicines you take. When should you call for help? Call 911 anytime you think you may need emergency care. For example, call if: 
? · You passed out (loses consciousness). ? · You have trouble breathing. ? · You pass maroon or bloody stools. ?Call your doctor now or seek immediate medical care if: 
? · You have pain that does not get better after your take pain medicine. ? · You have new or worse belly pain. ? · You have blood in your stools. ? · You are sick to your stomach and cannot keep fluids down. ? · You have a fever. ? · You cannot pass stools or gas. ?Watch closely for changes in your health, and be sure to contact your doctor if: 
? · Your throat still hurts after a day or two. ? · You do not get better as expected. Where can you learn more? Go to http://shaheen-mili.info/. Enter (76) 988-852 in the search box to learn more about \"Upper GI Endoscopy: What to Expect at Home. \" Current as of: May 12, 2017 Content Version: 11.4 © 5571-8211 [employer]. Care instructions adapted under license by [employer] (which disclaims liability or warranty for this information). If you have questions about a medical condition or this instruction, always ask your healthcare professional. [address] any warranty or liability for your use of this information. Introducing [facility]! Don Her introduces [employer] patient portal. Now you can access parts of your medical record, email your doctor's office, and request medication refills online. 1. In your internet browser, go to [email or web address] 2. Click on the First Time User? Click Here link in the Sign In box. You will see the New Member Sign Up page. 3. Enter your [employer] Access Code exactly as it appears below. You will not need to use this code after youve completed the sign-up process. If you do not sign up before the expiration date, you must request a new code. · [employer] Access Code: [email or web address] Expires: 3/5/2018 10:03 AM 
 
4. Enter the last four digits of your Social Security Number (xxxx) and Date of Birth (mm/dd/yyyy) as indicated and click Submit. You will be taken to the next sign-up page. 5. Create a [employer] ID. This will be your [employer] login ID and cannot be changed, so think of one that is secure and easy to remember. 6. Create a [employer] password. You can change your password at any time. 7. Enter your Password Reset Question and Answer.  This can be used at a later time if you forget your password. 8. Enter your e-mail address. You will receive e-mail notification when new information is available in [address] 9. Click Sign Up. You can now view and download portions of your medical record. 10. Click the Download Summary menu link to download a portable copy of your medical information. If you have questions, please visit the Frequently Asked Questions section of the [employer] website. Remember, [employer] is NOT to be used for urgent needs. For medical emergencies, dial 911. Now available from your iPhone and Android! Providers Seen During Your Hospitalization Provider Specialty Primary office phone Gia Cho MD Gastroenterology [phone] Your Primary Care Physician (PCP) Primary Care Physician Office Phone Office Fax Asael Ramos, [address] [facility] [phone] You are allergic to the following No active allergies Recent Documentation Height Weight BMI OB Status Smoking Status 1.6 m 65.9 kg 25.74 kg/m2 Hysterectomy Former Smoker Emergency Contacts Name Discharge Info Relation Home Work Mobile Christy Mitchell DISCHARGE CAREGIVER [3] Daughter [21]   [phone] [facility]- DISCHARGE CAREGIVER [3] Son [22] [phone] Patient Belongings The following personal items are in your possession at time of discharge: 
  Dental Appliances: None  Visual Aid: Glasses Please provide this summary of care documentation to your next provider. Signatures-by signing, you are acknowledging that this After Visit Summary has been reviewed with you and you have received a copy. Patient Signature:  ____________________________________________________________ Date:  ____________________________________________________________  
  
Orlando Wilburn  Provider Signature: ____________________________________________________________ Date:  ____________________________________________________________

## 2017-12-05 NOTE — ANESTHESIA POSTPROCEDURE EVALUATION
Post-Anesthesia Evaluation & Assessment    Visit Vitals    /61    Pulse 97    Temp 36.6 °C (97.9 °F)    Resp 16    Ht 5' 3\" (1.6 m)    Wt 65.9 kg (145 lb 5 oz)    SpO2 100%    BMI 25.74 kg/m2       Nausea/Vomiting: no nausea and no vomiting    Pain score (VAS): 0    Post-operative hydration adequate.     Mental status & Level of consciousness: orientation per pre-anesthetic level    Neurological status: moves all extremities, sensation grossly intact    Pulmonary status: airway patent, no supplemental oxygen required    Complications related to anesthesia: none    Additional comments:        Stephy Rivero CRNA  December 5, 2017

## 2017-12-05 NOTE — PROCEDURES
EGD Procedure Note    Patient: Jamal Ricks MRN: [id number]  SSN: [id number]    YOB: 1945  Age: 67 y.o. Sex: female      Date of Procedure: 12/5/2017      Procedures:  EGD :    HISTORY UPDATE: History and physical has been reviewed. The patient has been examined. There have been no significant clinical changes since the completion of the originally dated History and Physical.    INDICATION:  Carcinoid tumor of the cirrhosis     PROCEDURE PERFORMED: EGD    ENDOSCOPIST: Anabell Saab MD    ASSISTANT:  Endoscopy Technician-1: Lidia Davis  Endoscopy RN-1: Bhavana Robb RN    CLASSIFICATION OF PREOPERATIVE RISK: ASA 2 - Patient with mild systemic disease with no functional limitations    ANESTHESIA:  MAC anesthesia    ENDOSCOPE: [id number]                                                                                                              EXTENT OF EXAM: Second portion of the duodenum      DESCRIPTION OF PROCEDURE:   The procedure was discussed with the patient including purpose, risks, benefits and alternatives including but not limited to IV conscious sedation, bleeding, perforation and aspiration and the consent form was signed and witnessed. A safety timeout was performed. Procedure done with MAC. The patients vital signs were monitored at all times including heart rate and rhythm, oxygen saturation, and blood pressure. The patient was then placed into the left lateral decubitus position. The Olympus adult diagnostic endoscope was then passed under direct visualization to the second portion of the duodenum. The endoscope was then slowly withdrawn while closely visualizing the mucosa. In the stomach a retroflexion was performed and gastric fundus and cardia visualized. The scope was then removed. The patient was then transferred to the recovery room. FINDINGS:   Esophagus: The esophageal mucosa was normal with no ulceration, mass or stricture.   There was no evidence of Santana's esophagus or reflux esophagitis. Small hiatal hernia seen from 34 to 35 cm. Stomach: The gastric mucosa showed  no ulceration, mass, stricture. A 3 mm sessile polyp in the antrum biopsied. Retroflexion revealed a normal cardia and fundus . A 3 mm sessile polyp in the fundus removed with cold biopsy forceps. Duodenum: The duodenum mucosa was normal with no ulceration, mass,  stricture and no evidence of villous atrophy. EBL: None      SPECIMENS:   ID Type Source Tests Collected by Time Destination   1 : bx polyp gastric fundus Preservative Gastric  Beatris Avila MD 12/5/2017 0908 Pathology   2 : bx polyp gastric antrum Preservative Gastric  Wen Eugene MD 12/5/2017 3505 Pathology       IMPRESSION: A 3 mm sessile polyp in the antrum biopsied . A 3 mm sessile polyp in the fundus removed with cold biopsy forceps. PLAN 1: Discharge when sedation criteria are met. 2.  Resume regular Diet as tolerated. 3. Further recommendations will be made based on pathology results. Follow Up:  As scheduled.      Beatris Avila MD  12/5/2017

## 2017-12-05 NOTE — H&P
History and Physical    [address] [facility]  1945  [id number]  [id number]    Pre-Procedure Diagnosis:  d3a.092  carcinoid tumor      Evaluation of past illnesses, surgeries, or injuries:   YES  Past Medical History:   Diagnosis Date    Atrial fibrillation ([address]     CAD (coronary artery disease)     Carcinoid tumor     GERD (gastroesophageal reflux disease)      Past Surgical History:   Procedure Laterality Date    HX APPENDECTOMY      HX GYN      partial hysterectomy    HX ORTHOPAEDIC      knee arthroscopic, Bunionectomy    HX ORTHOPAEDIC      Bunionectomy     HX OTHER SURGICAL      Tumor removed from left shoulder blade    HX ROTATOR CUFF REPAIR Right     HX TONSILLECTOMY         Allergies:  No Known Allergies    Previous reactions to sedation/analgesia? NO    Review of current medications, supplement, herbals and nutraceuticals complete:  YES  Current Facility-Administered Medications   Medication Dose Route Frequency Provider Last Rate Last Dose    lactated Ringers infusion  25 mL/hr IntraVENous CONTINUOUS Elo Frey CRNA        famotidine (PEPCID) tablet 20 mg  20 mg Oral ONCE Elo Frey CRNA              Pertinent labs reviewed? YES    History of substance abuse? NO  History reviewed. No pertinent family history. Social History     Social History    Marital status:      Spouse name: N/A    Number of children: N/A    Years of education: N/A     Occupational History    Not on file.      Social History Main Topics    Smoking status: Former Smoker    Smokeless tobacco: Never Used    Alcohol use No    Drug use: No    Sexual activity: Not on file     Other Topics Concern    Not on file     Social History Narrative       Cardiac Status:  WNL    Mental Status:  WNL     Pulmonary Status:  WNL    NPO:  5-8    Assessment/Impression: Carcinoid tumor of stomach    Plan of treatment:EGD        Wen Eugene MD  12/5/2017  9:15 AM

## 2017-12-05 NOTE — DISCHARGE INSTRUCTIONS
DISCHARGE SUMMARY from Nurse    PATIENT INSTRUCTIONS:    After general anesthesia or intravenous sedation, for 24 hours or while taking prescription Narcotics:  · Limit your activities  · Do not drive and operate hazardous machinery  · Do not make important personal or business decisions  · Do  not drink alcoholic beverages  · If you have not urinated within 8 hours after discharge, please contact your surgeon on call. Report the following to your surgeon:  · Excessive pain, swelling, redness or odor of or around the surgical area  · Temperature over 100.5  · Nausea and vomiting lasting longer than 4 hours or if unable to take medications  · Any signs of decreased circulation or nerve impairment to extremity: change in color, persistent  numbness, tingling, coldness or increase pain  · Any questions    What to do at Home:    No smoking/ No tobacco products/ Avoid exposure to second hand smoke  Surgeon General's Warning:  Quitting smoking now greatly reduces serious risk to your health. Obesity, smoking, and sedentary lifestyle greatly increases your risk for illness    A healthy diet, regular physical exercise & weight monitoring are important for maintaining a healthy lifestyle    You may be retaining fluid if you have a history of heart failure or if you experience any of the following symptoms:  Weight gain of 3 pounds or more overnight or 5 pounds in a week, increased swelling in our hands or feet or shortness of breath while lying flat in bed. Please call your doctor as soon as you notice any of these symptoms; do not wait until your next office visit. Recognize signs and symptoms of STROKE:    F-face looks uneven    A-arms unable to move or move unevenly    S-speech slurred or non-existent    T-time-call 911 as soon as signs and symptoms begin-DO NOT go       Back to bed or wait to see if you get better-TIME IS BRAIN.     Warning Signs of HEART ATTACK     Call 911 if you have these symptoms:   Chest discomfort. Most heart attacks involve discomfort in the center of the chest that lasts more than a few minutes, or that goes away and comes back. It can feel like uncomfortable pressure, squeezing, fullness, or pain.  Discomfort in other areas of the upper body. Symptoms can include pain or discomfort in one or both arms, the back, neck, jaw, or stomach.  Shortness of breath with or without chest discomfort.  Other signs may include breaking out in a cold sweat, nausea, or lightheadedness. Don't wait more than five minutes to call 911 - MINUTES MATTER! Fast action can save your life. Calling 911 is almost always the fastest way to get lifesaving treatment. Emergency Medical Services staff can begin treatment when they arrive -- up to an hour sooner than if someone gets to the hospital by car. The discharge information has been reviewed with the patient. The patient verbalized understanding. Discharge medications reviewed with the patient and appropriate educational materials and side effects teaching were provided. ___________________________________________________________________________________________________________________________________     Upper GI Endoscopy: What to Expect at Home  Your Recovery  After you have an endoscopy, you will stay at the hospital or clinic for 1 to 2 hours. This will allow the medicine to wear off. You will be able to go home after your doctor or nurse checks to make sure you are not having any problems. You may have to stay overnight if you had treatment during the test. You may have a sore throat for a day or two after the test.  This care sheet gives you a general idea about what to expect after the test.  How can you care for yourself at home? Activity  · Rest as much as you need to after you go home.   · You should be able to go back to your usual activities the day after the test.  Diet  · Follow your doctor's directions for eating after the test.  · Drink plenty of fluids (unless your doctor has told you not to). Medications  · If you have a sore throat the day after the test, use an over-the-counter spray to numb your throat. Follow-up care is a key part of your treatment and safety. Be sure to make and go to all appointments, and call your doctor if you are having problems. It's also a good idea to know your test results and keep a list of the medicines you take. When should you call for help? Call 911 anytime you think you may need emergency care. For example, call if:  ? · You passed out (loses consciousness). ? · You have trouble breathing. ? · You pass maroon or bloody stools. ?Call your doctor now or seek immediate medical care if:  ? · You have pain that does not get better after your take pain medicine. ? · You have new or worse belly pain. ? · You have blood in your stools. ? · You are sick to your stomach and cannot keep fluids down. ? · You have a fever. ? · You cannot pass stools or gas. ? Watch closely for changes in your health, and be sure to contact your doctor if:  ? · Your throat still hurts after a day or two. ? · You do not get better as expected. Where can you learn more? Go to http://shaheen-mili.info/. Enter (83) 495-420 in the search box to learn more about \"Upper GI Endoscopy: What to Expect at Home. \"  Current as of: May 12, 2017  Content Version: 11.4  © 2547-1725 [employer], [employer]. Care instructions adapted under license by [employer] (which disclaims liability or warranty for this information). If you have questions about a medical condition or this instruction, always ask your healthcare professional. [address] any warranty or liability for your use of this information.

## 2018-01-03 ENCOUNTER — OFFICE VISIT (OUTPATIENT)
Dept: CARDIOLOGY CLINIC | Age: 73
End: 2018-01-03

## 2018-01-03 VITALS
OXYGEN SATURATION: 97 % | SYSTOLIC BLOOD PRESSURE: 136 MMHG | BODY MASS INDEX: 26.58 KG/M2 | HEART RATE: 82 BPM | HEIGHT: 63 IN | WEIGHT: 150 LBS | DIASTOLIC BLOOD PRESSURE: 86 MMHG

## 2018-01-03 DIAGNOSIS — R00.2 PALPITATIONS: ICD-10-CM

## 2018-01-03 DIAGNOSIS — I77.9 CAROTID DISEASE, BILATERAL (HCC): ICD-10-CM

## 2018-01-03 DIAGNOSIS — I48.0 PAF (PAROXYSMAL ATRIAL FIBRILLATION) (HCC): Primary | ICD-10-CM

## 2018-01-03 DIAGNOSIS — I51.89 DIASTOLIC DYSFUNCTION: ICD-10-CM

## 2018-01-03 NOTE — MR AVS SNAPSHOT
Visit Information Date & Time Provider Department Dept. Phone Encounter #  
 1/3/2018 11:00 AM Eran Gutierrez  [address] Specialist at [facility] [address] [phone] [id number] Follow-up Instructions Return in about 6 months (around 7/3/2018). Upcoming Health Maintenance Date Due DTaP/Tdap/Td series (1 - Tdap) 11/12/1965 FOBT Q 1 YEAR AGE 50-75 11/12/1994 ZOSTER VACCINE AGE 60> 9/12/2004 GLAUCOMA SCREENING Q2Y 11/12/2009 Pneumococcal 65+ Low/Medium Risk (1 of 2 - PCV13) 11/12/2009 MEDICARE YEARLY EXAM 11/12/2009 BREAST CANCER SCRN MAMMOGRAM 6/20/2013 Influenza Age 5 to Adult 8/1/2017 Allergies as of 1/3/2018  Review Complete On: 1/3/2018 By: James Pereyra LPN No Known Allergies Current Immunizations  Never Reviewed No immunizations on file. Not reviewed this visit Vitals BP Pulse Height(growth percentile) Weight(growth percentile) SpO2 BMI  
 136/86 82 5' 3\" (1.6 m) 150 lb (68 kg) 97% 26.57 kg/m2 OB Status Smoking Status Hysterectomy Former Smoker BMI and BSA Data Body Mass Index Body Surface Area  
 26.57 kg/m 2 1.74 m 2 Preferred Pharmacy Pharmacy Name Phone [address] [place] [phone] Your Updated Medication List  
  
   
This list is accurate as of: 1/3/18 11:13 AM.  Always use your most recent med list.  
  
  
  
  
 aspirin 81 mg tablet Take 81 mg by mouth.  
  
 calcium 600 mg Cap Take  by mouth. Cholecalciferol (Vitamin D3) 50,000 unit Cap Take 1 Cap by mouth every seven (7) days. cyanocobalamin 100 mcg tablet Commonly known as:  VITAMIN B12 Take 100 mcg by mouth daily. fluticasone 50 mcg/actuation nasal spray Commonly known as:  FLONASE  
1 [place] by Both Nostrils route as needed. lidocaine 5 % Commonly known as:  LIDODERM  
  
 metoprolol succinate 25 mg XL tablet Commonly known as:  TOPROL-XL Take 1 Tab by mouth daily. omega 3-dha-epa-fish oil 100-160-1,000 mg Cap Take  by mouth. Follow-up Instructions Return in about 6 months (around 7/3/2018). Introducing [facility]! Logan Pastor introduces [employer] patient portal. Now you can access parts of your medical record, email your doctor's office, and request medication refills online. 1. In your internet browser, go to [email or web address] 2. Click on the First Time User? Click Here link in the Sign In box. You will see the New Member Sign Up page. 3. Enter your [employer] Access Code exactly as it appears below. You will not need to use this code after youve completed the sign-up process. If you do not sign up before the expiration date, you must request a new code. · [employer] Access Code: [email or web address] Expires: 3/5/2018 10:03 AM 
 
4. Enter the last four digits of your Social Security Number (xxxx) and Date of Birth (mm/dd/yyyy) as indicated and click Submit. You will be taken to the next sign-up page. 5. Create a T[employer]Patrol ID. This will be your T[employer]rol login ID and cannot be changed, so think of one that is secure and easy to remember. 6. Create a [employer] password. You can change your password at any time. 7. Enter your Password Reset Question and Answer. This can be used at a later time if you forget your password. 8. Enter your e-mail address. You will receive e-mail notification when new information is available in [address] 9. Click Sign Up. You can now view and download portions of your medical record. 10. Click the Download Summary menu link to download a portable copy of your medical information. If you have questions, please visit the Frequently Asked Questions section of the [employer] website. Remember, [employer] is NOT to be used for urgent needs. For medical emergencies, dial 911. Now available from your iPhone and Android! Please provide this summary of care documentation to your next provider. Your primary care clinician is listed as Cecilio Sykes. If you have any questions after today's visit, please call [phone].

## 2018-01-03 NOTE — LETTER
2/13/2018 Patient:  Kristel Hill YOB: 1945 Date of Visit: 1/3/2018 Dear Umm Alex, NAINA 
[address] [facility] [address] [place] [address] VIA Facsimile: [phone] 
 : Thank you for referring Ms. Denise Davidson to me for evaluation/treatment. Below are the relevant portions of my assessment and plan of care. Subjective:  
   Anca Luna is in the office today for cardiac reevaluation. She is a 67year-old woman that was seen at [facility] on 08/21/2017. At that time, she was about to have rotator cuff surgery. She developed atrial fibrillation while in the presurgical area. A short time later, she did spontaneously convert to sinus rhythm. She was given a prescription for low dose beta-blocker in the form of Toprol XL 25 mg a day. In the office today, she says she continues to do well. She has completed the PT following her recent shoulder surgery. She has had no palpitations. She has had no chest pain. She has had no limiting shortness of breath, PND or orthopnea. She has had no near syncope or syncope. Patient Active Problem List  
 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 90 Tab 3  
 fluticasone (FLONASE) 50 mcg/actuation nasal spray 1 [place] by Both Nostrils route as needed.  cyanocobalamin (VITAMIN B12) 100 mcg tablet Take 100 mcg by mouth daily.  Cholecalciferol, Vitamin D3, 50,000 unit cap Take 1 Cap by mouth every seven (7) days.  aspirin 81 mg tablet Take 81 mg by mouth.  omega 3-dha-epa-fish oil 100-160-1,000 mg cap Take  by mouth.  calcium 600 mg cap Take  by mouth. No Known Allergies Past Medical History:  
Diagnosis Date  Atrial fibrillation ([address]  CAD (coronary artery disease)  Carcinoid tumor  GERD (gastroesophageal reflux disease) Past Surgical History:  
Procedure Laterality Date  HX APPENDECTOMY  HX GYN    
 partial hysterectomy  HX ORTHOPAEDIC    
 knee arthroscopic, Bunionectomy  HX ORTHOPAEDIC Bunionectomy  HX OTHER SURGICAL Tumor removed from left shoulder blade  HX ROTATOR CUFF REPAIR Right  HX TONSILLECTOMY No family history on file. History Smoking Status  Former Smoker Smokeless Tobacco  
 Never Used Review of Systems, additional: 
Constitutional: negative Eyes: negative Respiratory: negative Cardiovascular: positive for palpitations Gastrointestinal: negative Musculoskeletal:negative Neurological: negative Behvioral/Psych: negative Endocrine: negative ENT: negative Objective:  
 
Visit Vitals  /86  Pulse 82  Ht 5' 3\" (1.6 m)  Wt 150 lb (68 kg)  SpO2 97%  BMI 26.57 kg/m2 General:  alert, cooperative, no distress Chest Wall: inspection normal - no chest wall deformities or tenderness, respiratory effort normal  
Lung: clear to auscultation bilaterally Heart:  normal rate and regular rhythm, S1 and S2 normal, no murmurs noted, no gallops noted Abdomen: soft, non-tender. Bowel sounds normal. No masses,  no organomegaly Extremities: extremities normal, atraumatic, no cyanosis or edema Skin: no rashes Neuro: alert, oriented, normal speech, no focal findings or movement disorder noted EK2017; Sinus rhythm. Diffuse nondiagnostic ST & T wave abnormalities Assessment/Plan: ICD-10-CM ICD-9-CM 1.  Atrial fibrillation, unspecified type ([address] seems to be maintaining sinus rhythm since episode in hospital . RT 6 mos I48.91 427.31 AMB POC EKG ROUTINE W/ 12 LEADS, INTER & REP 2. PAF (paroxysmal atrial fibrillation) (HCC) I48.0 427.31   
3. Diastolic dysfunction, recent echo 6/99/6949; normal systolic function. Grade 1 diastolic dysfunction. Mild LAE. I51.9 429.9 4. Palpitations, none recently R00.2 785.1 5. Preoperative clearance, patient had her shoulder surgery and starts rehab next week. Z01.818 V72.84   
6. Carotid disease, bilateral (HCC) I77.9 447.9 If you have questions, please do not hesitate to call me. I look forward to following Ms. Estephania Montero along with you. Sincerely, Miguel Lujan MD

## 2018-01-03 NOTE — PROGRESS NOTES
1. Have you been to the ER, urgent care clinic since your last visit? Hospitalized since your last visit? No    2. Have you seen or consulted any other health care providers outside of the [address] [place] [address] since your last visit? Include any pap smears or colon screening.  No

## 2018-01-15 NOTE — PROGRESS NOTES
Subjective:      Bhavin Doty is in the office today for cardiac reevaluation. She is a 67year-old woman that was seen at [facility] on 08/21/2017. At that time, she was about to have rotator cuff surgery. She developed atrial fibrillation while in the presurgical area. A short time later, she did spontaneously convert to sinus rhythm. She was given a prescription for low dose beta-blocker in the form of Toprol XL 25 mg a day. In the office today, she says she continues to do well. She has completed the PT following her recent shoulder surgery. She has had no palpitations. She has had no chest pain. She has had no limiting shortness of breath, PND or orthopnea. She has had no near syncope or syncope. 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 90 Tab 3    fluticasone (FLONASE) 50 mcg/actuation nasal spray 1 [place] by Both Nostrils route as needed.  cyanocobalamin (VITAMIN B12) 100 mcg tablet Take 100 mcg by mouth daily.  Cholecalciferol, Vitamin D3, 50,000 unit cap Take 1 Cap by mouth every seven (7) days.  aspirin 81 mg tablet Take 81 mg by mouth.  omega 3-dha-epa-fish oil 100-160-1,000 mg cap Take  by mouth.  calcium 600 mg cap Take  by mouth.        No Known Allergies  Past Medical History:   Diagnosis Date    Atrial fibrillation ([facility]     CAD (coronary artery disease)     Carcinoid tumor     GERD (gastroesophageal reflux disease)      Past Surgical History:   Procedure Laterality Date    HX APPENDECTOMY      HX GYN partial hysterectomy    HX ORTHOPAEDIC      knee arthroscopic, Bunionectomy    HX ORTHOPAEDIC      Bunionectomy     HX OTHER SURGICAL      Tumor removed from left shoulder blade    HX ROTATOR CUFF REPAIR Right     HX TONSILLECTOMY       No family history on file. History   Smoking Status    Former Smoker   Smokeless Tobacco    Never Used          Review of Systems, additional:  Constitutional: negative  Eyes: negative  Respiratory: negative  Cardiovascular: positive for palpitations  Gastrointestinal: negative  Musculoskeletal:negative  Neurological: negative  Behvioral/Psych: negative  Endocrine: negative  ENT: negative    Objective:     Visit Vitals    /86    Pulse 82    Ht 5' 3\" (1.6 m)    Wt 150 lb (68 kg)    SpO2 97%    BMI 26.57 kg/m2     General:  alert, cooperative, no distress   Chest Wall: inspection normal - no chest wall deformities or tenderness, respiratory effort normal   Lung: clear to auscultation bilaterally   Heart:  normal rate and regular rhythm, S1 and S2 normal, no murmurs noted, no gallops noted   Abdomen: soft, non-tender. Bowel sounds normal. No masses,  no organomegaly   Extremities: extremities normal, atraumatic, no cyanosis or edema Skin: no rashes   Neuro: alert, oriented, normal speech, no focal findings or movement disorder noted     EK2017; Sinus rhythm. Diffuse nondiagnostic ST & T wave abnormalities    Assessment/Plan:       ICD-10-CM ICD-9-CM    1. Atrial fibrillation, unspecified type ([address] seems to be maintaining sinus rhythm since episode in hospital . RT 6 mos I48.91 427.31 AMB POC EKG ROUTINE W/ 12 LEADS, INTER & REP   2. PAF (paroxysmal atrial fibrillation) ([facility]) I48.0 427.31    3. Diastolic dysfunction, recent echo ; normal systolic function. Grade 1 diastolic dysfunction. Mild LAE. I51.9 429.9    4. Palpitations, none recently R00.2 785.1    5. Preoperative clearance, patient had her shoulder surgery and starts rehab next week. Z01.818 V72.84    6.  Carotid disease, bilateral (HCC) I77.9 447.9

## 2018-01-23 RX ORDER — METOPROLOL SUCCINATE 25 MG/1
25 TABLET, EXTENDED RELEASE ORAL DAILY
Qty: 90 TAB | Refills: 3 | Status: SHIPPED | OUTPATIENT
Start: 2018-01-23 | End: 2018-02-01 | Stop reason: SDUPTHER

## 2018-02-05 RX ORDER — METOPROLOL SUCCINATE 25 MG/1
25 TABLET, EXTENDED RELEASE ORAL DAILY
Qty: 90 TAB | Refills: 3 | Status: SHIPPED | OUTPATIENT
Start: 2018-02-05 | End: 2019-01-16 | Stop reason: SDUPTHER

## 2018-02-13 NOTE — COMMUNICATION BODY
Subjective:      Valeria Partida is in the office today for cardiac reevaluation. She is a 67year-old woman that was seen at [facility] on 08/21/2017. At that time, she was about to have rotator cuff surgery. She developed atrial fibrillation while in the presurgical area. A short time later, she did spontaneously convert to sinus rhythm. She was given a prescription for low dose beta-blocker in the form of Toprol XL 25 mg a day. In the office today, she says she continues to do well. She has completed the PT following her recent shoulder surgery. She has had no palpitations. She has had no chest pain. She has had no limiting shortness of breath, PND or orthopnea. She has had no near syncope or syncope. 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 90 Tab 3    fluticasone (FLONASE) 50 mcg/actuation nasal spray 1 [place] by Both Nostrils route as needed.  cyanocobalamin (VITAMIN B12) 100 mcg tablet Take 100 mcg by mouth daily.  Cholecalciferol, Vitamin D3, 50,000 unit cap Take 1 Cap by mouth every seven (7) days.  aspirin 81 mg tablet Take 81 mg by mouth.  omega 3-dha-epa-fish oil 100-160-1,000 mg cap Take  by mouth.  calcium 600 mg cap Take  by mouth.        No Known Allergies  Past Medical History:   Diagnosis Date    Atrial fibrillation ([address]     CAD (coronary artery disease)     Carcinoid tumor     GERD (gastroesophageal reflux disease)      Past Surgical History:   Procedure Laterality Date    HX APPENDECTOMY      HX GYN partial hysterectomy    HX ORTHOPAEDIC      knee arthroscopic, Bunionectomy    HX ORTHOPAEDIC      Bunionectomy     HX OTHER SURGICAL      Tumor removed from left shoulder blade    HX ROTATOR CUFF REPAIR Right     HX TONSILLECTOMY       No family history on file. History   Smoking Status    Former Smoker   Smokeless Tobacco    Never Used          Review of Systems, additional:  Constitutional: negative  Eyes: negative  Respiratory: negative  Cardiovascular: positive for palpitations  Gastrointestinal: negative  Musculoskeletal:negative  Neurological: negative  Behvioral/Psych: negative  Endocrine: negative  ENT: negative    Objective:     Visit Vitals    /86    Pulse 82    Ht 5' 3\" (1.6 m)    Wt 150 lb (68 kg)    SpO2 97%    BMI 26.57 kg/m2     General:  alert, cooperative, no distress   Chest Wall: inspection normal - no chest wall deformities or tenderness, respiratory effort normal   Lung: clear to auscultation bilaterally   Heart:  normal rate and regular rhythm, S1 and S2 normal, no murmurs noted, no gallops noted   Abdomen: soft, non-tender. Bowel sounds normal. No masses,  no organomegaly   Extremities: extremities normal, atraumatic, no cyanosis or edema Skin: no rashes   Neuro: alert, oriented, normal speech, no focal findings or movement disorder noted     EK2017; Sinus rhythm. Diffuse nondiagnostic ST & T wave abnormalities    Assessment/Plan:       ICD-10-CM ICD-9-CM    1. Atrial fibrillation, unspecified type ([address] seems to be maintaining sinus rhythm since episode in hospital . RT 6 mos I48.91 427.31 AMB POC EKG ROUTINE W/ 12 LEADS, INTER & REP   2. PAF (paroxysmal atrial fibrillation) ([facility]) I48.0 427.31    3. Diastolic dysfunction, recent echo ; normal systolic function. Grade 1 diastolic dysfunction. Mild LAE. I51.9 429.9    4. Palpitations, none recently R00.2 785.1    5. Preoperative clearance, patient had her shoulder surgery and starts rehab next week. Z01.818 V72.84    6.  Carotid disease, bilateral (HCC) I77.9 447.9

## 2018-07-02 ENCOUNTER — OFFICE VISIT (OUTPATIENT)
Dept: CARDIOLOGY CLINIC | Age: 73
End: 2018-07-02

## 2018-07-02 DIAGNOSIS — Z01.818 PREOPERATIVE CLEARANCE: ICD-10-CM

## 2018-07-02 DIAGNOSIS — D3A.092 CARCINOID TUMOR OF STOMACH: ICD-10-CM

## 2018-07-02 DIAGNOSIS — I77.9 CAROTID DISEASE, BILATERAL (HCC): ICD-10-CM

## 2018-07-02 DIAGNOSIS — R00.2 PALPITATIONS: ICD-10-CM

## 2018-07-02 DIAGNOSIS — I48.0 PAF (PAROXYSMAL ATRIAL FIBRILLATION) (HCC): Primary | ICD-10-CM

## 2018-07-02 DIAGNOSIS — I51.89 DIASTOLIC DYSFUNCTION: ICD-10-CM

## 2018-07-02 RX ORDER — DICLOFENAC SODIUM 10 MG/G
GEL TOPICAL AS NEEDED
COMMUNITY

## 2018-07-02 NOTE — PROGRESS NOTES
1. Have you been to the ER, urgent care clinic since your last visit? Hospitalized since your last visit? No     2. Have you seen or consulted any other health care providers outside of the [address] [place] [address] since your last visit? Include any pap smears or colon screening.  No

## 2018-07-16 VITALS
HEIGHT: 63 IN | OXYGEN SATURATION: 97 % | BODY MASS INDEX: 27.46 KG/M2 | DIASTOLIC BLOOD PRESSURE: 79 MMHG | SYSTOLIC BLOOD PRESSURE: 129 MMHG | HEART RATE: 71 BPM | WEIGHT: 155 LBS

## 2018-07-17 ENCOUNTER — TELEPHONE (OUTPATIENT)
Dept: CARDIOLOGY CLINIC | Age: 73
End: 2018-07-17

## 2018-07-17 NOTE — TELEPHONE ENCOUNTER
Patient called in requesting office note from 7/2/18 be mailed to her. Unfortunately, the release form she said she signed is not in patients chart for the 7/2/18 encounter. I proposed that I would send a release form to her new address in [place], [place] and she can either e-mail or fax that back for immediate return of the office note. Patient agreed.

## 2018-07-17 NOTE — PROGRESS NOTES
Subjective:      Belle Bey is in the office today for cardiac reevaluation. She is a 67year-old woman that was seen at [facility] on 08/21/2017. At that time, she was about to have rotator cuff surgery. She developed atrial fibrillation while in the presurgical area. A short time later, she did spontaneously convert to sinus rhythm. She was given a prescription for low dose beta-blocker in the form of Toprol XL 25 mg a day. In the office today, she reports that she feels good. She has had no palpitations. She has had no CP or SOB. She informs that she will be moving out of the area. 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  metoprolol succinate (TOPROL-XL) 25 mg XL tablet Take 1 Tab by mouth daily. Indications: MULTIFOCAL ATRIAL TACHYCARDIA 90 Tab 3    lidocaine (LIDODERM) 5 %       cyanocobalamin (VITAMIN B12) 100 mcg tablet Take 100 mcg by mouth daily.  Cholecalciferol, Vitamin D3, 50,000 unit cap Take 1 Cap by mouth every seven (7) days.  aspirin 81 mg tablet Take 81 mg by mouth.  omega 3-dha-epa-fish oil 100-160-1,000 mg cap Take  by mouth.  calcium 600 mg cap Take  by mouth.        No Known Allergies  Past Medical History:   Diagnosis Date    Atrial fibrillation ([address]     CAD (coronary artery disease)     Carcinoid tumor     GERD (gastroesophageal reflux disease)      Past Surgical History:   Procedure Laterality Date    HX APPENDECTOMY      HX GYN      partial hysterectomy    HX ORTHOPAEDIC      knee arthroscopic, Bunionectomy    HX ORTHOPAEDIC      Bunionectomy     HX OTHER SURGICAL      Tumor removed from left shoulder blade    HX ROTATOR CUFF REPAIR Right     HX TONSILLECTOMY       No family history on file. History   Smoking Status    Former Smoker   Smokeless Tobacco    Never Used          Review of Systems, additional:  Constitutional: negative  Eyes: negative  Respiratory: negative  Cardiovascular: positive for palpitations  Gastrointestinal: negative  Musculoskeletal:negative  Neurological: negative  Behvioral/Psych: negative  Endocrine: negative  ENT: negative    Objective:     Visit Vitals    /79    Pulse 71    Ht 5' 3\" (1.6 m)    Wt 155 lb (70.3 kg)    SpO2 97%    BMI 27.46 kg/m2     General:  alert, cooperative, no distress   Chest Wall: inspection normal - no chest wall deformities or tenderness, respiratory effort normal   Lung: clear to auscultation bilaterally   Heart:  normal rate and regular rhythm, S1 and S2 normal, no murmurs noted, no gallops noted   Abdomen: soft, non-tender. Bowel sounds normal. No masses,  no organomegaly   Extremities: extremities normal, atraumatic, no cyanosis or edema Skin: no rashes   Neuro: alert, oriented, normal speech, no focal findings or movement disorder noted     EK2017; Sinus rhythm. Diffuse nondiagnostic ST & T wave abnormalities    Assessment/Plan:       ICD-10-CM ICD-9-CM    1. Atrial fibrillation, unspecified type ([address] seems to be maintaining sinus rhythm since episode in hospital . RT PRN I48.91 427.31 AMB POC EKG ROUTINE W/ 12 LEADS, INTER & REP   2. PAF (paroxysmal atrial fibrillation) ([facility]) I48.0 427.31    3. Diastolic dysfunction, echo done 2080; normal systolic function. Grade 1 diastolic dysfunction. Mild LAE. I51.9 429.9    4. Palpitations, none recently R00.2 785.1    5. S/P shoulder surgery      6.  Carotid disease, bilateral ([facility]) I77.9 447.9

## 2019-01-16 RX ORDER — METOPROLOL SUCCINATE 25 MG/1
TABLET, EXTENDED RELEASE ORAL
Qty: 90 TAB | Refills: 3 | Status: SHIPPED | OUTPATIENT
Start: 2019-01-16 | End: 2020-02-05

## 2019-01-22 ENCOUNTER — ANESTHESIA EVENT (OUTPATIENT)
Dept: ENDOSCOPY | Age: 74
End: 2019-01-22
Payer: MEDICARE

## 2019-01-22 RX ORDER — LANOLIN ALCOHOL/MO/W.PET/CERES
1000 CREAM (GRAM) TOPICAL DAILY
COMMUNITY

## 2019-01-22 NOTE — PERIOP NOTES
PAT - SURGICAL PRE-ADMISSION INSTRUCTIONS    NAME:  Odette Saravia                                                          TODAY'S DATE:  1/22/2019    SURGERY DATE:  1/23/2019                                  SURGERY ARRIVAL TIME:   1200    1. Do NOT eat or drink anything, including candy or gum, after MIDNIGHT on 01/22/19 , unless you have specific instructions from your Surgeon or Anesthesia Provider to do so. 2. No smoking on the day of surgery. 3. No alcohol 24 hours prior to the day of surgery. 4. No recreational drugs for one week prior to the day of surgery. 5. Leave all valuables, including money/purse, at home. 6. Remove all jewelry, nail polish, makeup (including mascara); no lotions, powders, deodorant, or perfume/cologne/after shave. 7. Glasses/Contact lenses and Dentures may be worn to the hospital.  They will be removed prior to surgery. 8. Call your doctor if symptoms of a cold or illness develop within 24 ours prior to surgery. 9. AN ADULT MUST DRIVE YOU HOME AFTER OUTPATIENT SURGERY. 10. If you are having an OUTPATIENT procedure, please make arrangements for a responsible adult to be with you for 24 hours after your surgery. 11. If you are admitted to the hospital, you will be assigned to a bed after surgery is complete. Normally a family member will not be able to see you until you are in your assigned bed. 15. Family is encouraged to accompany you to the hospital.  We ask visitors in the treatment area to be limited to ONE person at a time to ensure patient privacy. EXCEPTIONS WILL BE MADE AS NEEDED. 15. Children under 12 are discouraged from entering the treatment area and need to be supervised by an adult when in the waiting room. Special Instructions: Take these medications the morning of surgery with a sip of water:  Toprol    Patient Prep:    shower with anti-bacterial soap    These surgical instructions were reviewed with Leandro Baez during the PAT phone call. Directions: On the morning of surgery, please go to the [facility]. Enter the building from the [facility] entrance, 1st floor (next to the Emergency Room entrance). Take the elevator to the 2nd floor. Sign in at the Registration Desk.     If you have any questions and/or concerns, please do not hesitate to call:  (Prior to the day of surgery)  [facility] unit:  [phone]  (Day of surgery)  [facility] unit:  [phone]

## 2019-01-23 ENCOUNTER — ANESTHESIA (OUTPATIENT)
Dept: ENDOSCOPY | Age: 74
End: 2019-01-23
Payer: MEDICARE

## 2019-01-23 ENCOUNTER — HOSPITAL ENCOUNTER (OUTPATIENT)
Age: 74
Setting detail: OUTPATIENT SURGERY
Discharge: HOME | End: 2019-01-23
Attending: INTERNAL MEDICINE | Admitting: INTERNAL MEDICINE
Payer: MEDICARE

## 2019-01-23 VITALS
DIASTOLIC BLOOD PRESSURE: 73 MMHG | BODY MASS INDEX: 26.48 KG/M2 | TEMPERATURE: 98.3 F | HEART RATE: 92 BPM | OXYGEN SATURATION: 100 % | WEIGHT: 149.44 LBS | RESPIRATION RATE: 18 BRPM | HEIGHT: 63 IN | SYSTOLIC BLOOD PRESSURE: 134 MMHG

## 2019-01-23 LAB
ATRIAL RATE: 88 BPM
CALCULATED P AXIS, ECG09: 41 DEGREES
CALCULATED R AXIS, ECG10: -6 DEGREES
CALCULATED T AXIS, ECG11: -11 DEGREES
DIAGNOSIS, 93000: NORMAL
P-R INTERVAL, ECG05: 136 MS
Q-T INTERVAL, ECG07: 368 MS
QRS DURATION, ECG06: 90 MS
QTC CALCULATION (BEZET), ECG08: 445 MS
VENTRICULAR RATE, ECG03: 88 BPM

## 2019-01-23 PROCEDURE — 76060000031 HC ANESTHESIA FIRST 0.5 HR: Performed by: INTERNAL MEDICINE

## 2019-01-23 PROCEDURE — 74011250636 HC RX REV CODE- 250/636

## 2019-01-23 PROCEDURE — 74011250636 HC RX REV CODE- 250/636: Performed by: NURSE ANESTHETIST, CERTIFIED REGISTERED

## 2019-01-23 PROCEDURE — 76040000019: Performed by: INTERNAL MEDICINE

## 2019-01-23 PROCEDURE — 93005 ELECTROCARDIOGRAM TRACING: CPT

## 2019-01-23 RX ORDER — LIDOCAINE HYDROCHLORIDE 20 MG/ML
INJECTION, SOLUTION EPIDURAL; INFILTRATION; INTRACAUDAL; PERINEURAL AS NEEDED
Status: DISCONTINUED | OUTPATIENT
Start: 2019-01-23 | End: 2019-01-23 | Stop reason: HOSPADM

## 2019-01-23 RX ORDER — LIDOCAINE HYDROCHLORIDE 10 MG/ML
0.1 INJECTION, SOLUTION EPIDURAL; INFILTRATION; INTRACAUDAL; PERINEURAL AS NEEDED
Status: DISCONTINUED | OUTPATIENT
Start: 2019-01-23 | End: 2019-01-23 | Stop reason: HOSPADM

## 2019-01-23 RX ORDER — SODIUM CHLORIDE 0.9 % (FLUSH) 0.9 %
5-40 SYRINGE (ML) INJECTION EVERY 8 HOURS
Status: CANCELLED | OUTPATIENT
Start: 2019-01-23

## 2019-01-23 RX ORDER — SODIUM CHLORIDE 9 MG/ML
25 INJECTION, SOLUTION INTRAVENOUS CONTINUOUS
Status: CANCELLED | OUTPATIENT
Start: 2019-01-23 | End: 2019-01-23

## 2019-01-23 RX ORDER — PROPOFOL 10 MG/ML
INJECTION, EMULSION INTRAVENOUS AS NEEDED
Status: DISCONTINUED | OUTPATIENT
Start: 2019-01-23 | End: 2019-01-23 | Stop reason: HOSPADM

## 2019-01-23 RX ORDER — SODIUM CHLORIDE 0.9 % (FLUSH) 0.9 %
5-40 SYRINGE (ML) INJECTION AS NEEDED
Status: CANCELLED | OUTPATIENT
Start: 2019-01-23

## 2019-01-23 RX ORDER — SODIUM CHLORIDE, SODIUM LACTATE, POTASSIUM CHLORIDE, CALCIUM CHLORIDE 600; 310; 30; 20 MG/100ML; MG/100ML; MG/100ML; MG/100ML
50 INJECTION, SOLUTION INTRAVENOUS CONTINUOUS
Status: DISCONTINUED | OUTPATIENT
Start: 2019-01-24 | End: 2019-01-23 | Stop reason: HOSPADM

## 2019-01-23 RX ADMIN — SODIUM CHLORIDE, SODIUM LACTATE, POTASSIUM CHLORIDE, AND CALCIUM CHLORIDE 50 ML/HR: 600; 310; 30; 20 INJECTION, SOLUTION INTRAVENOUS at 13:09

## 2019-01-23 RX ADMIN — PROPOFOL 70 MG: 10 INJECTION, EMULSION INTRAVENOUS at 13:59

## 2019-01-23 RX ADMIN — PROPOFOL 50 MG: 10 INJECTION, EMULSION INTRAVENOUS at 14:02

## 2019-01-23 RX ADMIN — LIDOCAINE HYDROCHLORIDE 60 MG: 20 INJECTION, SOLUTION EPIDURAL; INFILTRATION; INTRACAUDAL; PERINEURAL at 13:59

## 2019-01-23 NOTE — PROCEDURES
EGD Procedure Note    Patient: Maria Luisa Sinclair MRN: [id number]  SSN: [id number]    YOB: 1945  Age: 75 y.o. Sex: female      Date of Procedure: 1/23/2019      Procedures:  EGD :    HISTORY UPDATE: History and physical has been reviewed. The patient has been examined. There have been no significant clinical changes since the completion of the originally dated History and Physical.    INDICATION:  History of gastric carcinoid     PROCEDURE PERFORMED: EGD    ENDOSCOPIST: Nasrin Talbert MD    ASSISTANT:  Endoscopy Technician-1: Jason Avila  Endoscopy Technician-2: Rishi Otero 3 [facility]  PACU Nurse: Ari Shin RN    CLASSIFICATION OF PREOPERATIVE RISK: ASA 2 - Patient with mild systemic disease with no functional limitations    ANESTHESIA:  MAC anesthesia    ENDOSCOPE: [id number]                                                                                                              EXTENT OF EXAM: Second portion of the duodenum      DESCRIPTION OF PROCEDURE:   The procedure was discussed with the patient including purpose, risks, benefits and alternatives including but not limited to IV conscious sedation, bleeding, perforation and aspiration and the consent form was signed and witnessed. A safety timeout was performed. Procedure done with MAC. The patients vital signs were monitored at all times including heart rate and rhythm, oxygen saturation, and blood pressure. The patient was then placed into the left lateral decubitus position. The Olympus adult diagnostic endoscope was then passed under direct visualization to the second portion of the duodenum. The endoscope was then slowly withdrawn while closely visualizing the mucosa. In the stomach a retroflexion was performed and gastric fundus and cardia visualized. The scope was then removed. The patient was then transferred to the recovery room. FINDINGS:   Esophagus: The esophageal mucosa was normal with no ulceration, mass or stricture. There was no evidence of Santana's esophagus or reflux esophagitis. A trivial Hiatal hernia seen. Stomach: The gastric mucosa was normal with no ulceration, mass, stricture. Retroflexion revealed a normal cardia and fundus . Duodenum: The duodenum mucosa was normal with no ulceration, mass, stricture and no evidence of villous atrophy. EBL: None      SPECIMENS: None    IMPRESSION: Trivial hiatal hernia otherwise normal.     PLAN 1: Discharge when sedation criteria are met. 2.  Resume regular Diet as tolerated.       Follow Up:  As scheduled      German Dacosta MD  1/23/2019

## 2019-01-23 NOTE — H&P
History and Physical    [facility]  1945  [id number]  [id number]    Pre-Procedure Diagnosis:  [id number]      Evaluation of past illnesses, surgeries, or injuries:   YES  Past Medical History:   Diagnosis Date    [facility] [address]     CAD (coronary artery disease)     Carcinoid tumor     Stomach     Past Surgical History:   Procedure Laterality Date    HX APPENDECTOMY      HX HYSTERECTOMY      partial hysterectomy    HX KNEE ARTHROSCOPY Left     HX ORTHOPAEDIC      Bunionectomy     HX OTHER SURGICAL      Tumor removed from left shoulder blade    HX ROTATOR CUFF REPAIR Right     HX TONSILLECTOMY         Allergies:  No Known Allergies    Previous reactions to sedation/analgesia? NO    Review of current medications, supplement, herbals and nutraceuticals complete:  YES  Current Facility-Administered Medications   Medication Dose Route Frequency Provider Last Rate Last Dose    lidocaine (PF) (XYLOCAINE) 10 mg/mL (1 %) injection 0.1 mL  0.1 mL SubCUTAneous PRN Siena Barton CRNA        [START ON 1/24/2019] lactated Ringers infusion  50 mL/hr IntraVENous CONTINUOUS BlSiena ferguson Silver CRNA 50 mL/hr at 01/23/19 1309 50 mL/hr at 01/23/19 1309          Pertinent labs reviewed? YES    History of substance abuse? NO  History reviewed. No pertinent family history.   Social History     Socioeconomic History    Marital status:      Spouse name: Not on file    Number of children: Not on file    Years of education: Not on file    Highest education level: Not on file   Social Needs    Financial resource strain: Not on file    Food insecurity - worry: Not on file    Food insecurity - inability: Not on file    Transportation needs - medical: Not on file   [employer] needs - non-medical: Not on file   Occupational History    Not on file   Tobacco Use    Smoking status: Former Smoker    Smokeless tobacco: Never Used   Substance and Sexual Activity    Alcohol use: No    Drug use: No    Sexual activity: Not on file   Other Topics Concern    Not on file   Social History Narrative    Not on file       Cardiac Status:  WNL    Mental Status:  WNL     Pulmonary Status:  WNL    NPO:  5-8    Assessment/Impression: History of gastric carcinoid     Plan of treatment: EGD      Wen Eugene MD  1/23/2019  1:23 PM

## 2019-01-23 NOTE — PERIOP NOTES
Phase 2 Recovery Summary  Patient arrived to Phase 2 at 1414  Report received from Allie Mccarthyad:    01/22/19 1524 01/23/19 1255 01/23/19 1414   BP:  157/83 134/73   Pulse:  91 92   Resp:  18 18   Temp:  98.3 °F (36.8 °C)    SpO2:  97% 98%   Weight: 67.1 kg (148 lb) 67.8 kg (149 lb 7 oz)    Height: 5' 3\" (1.6 m) 5' 3\" (1.6 m)        oriented to time, place, person and situation    Lines and Drains  Peripheral Intravenous Line:   Peripheral IV 01/23/19 Right Arm (Active)   Site Assessment Clean, dry, & intact 1/23/2019  2:16 PM   Phlebitis Assessment 0 1/23/2019  2:16 PM   Infiltration Assessment 0 1/23/2019  2:16 PM   Dressing Status Clean, dry, & intact 1/23/2019  2:16 PM   Dressing Type Tape;Transparent 1/23/2019  2:16 PM   Hub Color/Line Status Pink; Infusing 1/23/2019  2:16 PM       Wound  Wound Shoulder Right (Active)   Number of days: 478      Patient arrived to phase 2 from Endo. Alert and oriented x4. No complaints of pain, nausea or dizziness. Patient ambulated from bed to chair with minimal assistance. 26- Dr. Sal Aguiar at patient bedside. Patient given ginger ale to sip on. IV removed and patient allowed to get dressed. Daughter was flying in from [place]. Awaiting her arrival.     1500-Unable to get in contact with Daughter. Patient called Manager of her building and able to get a ride. 1540- Manager arrived. Reviewed discharge instructions with patient and friend. Patient signed for discharge. Transported patient to vehicle via wheelchair.      Patient discharged to home with friend  at [address] [place] [address]

## 2019-01-23 NOTE — DISCHARGE INSTRUCTIONS
EGD DISCHARGE INSTRUCTIONS    You have just had an examination of your esophagus (swallowing tube), stomach and duodenum (the first part of your small intestine) and of your colon (large intestine). The medication given to you during your procedure will be acting in your body for the next 12 hours, gradually wearing off. Your face may be flushed, skin may be warm and sweaty, you might feel a little bloated or nauseated and sleepy. 1. For the next 12 hours you SHOULD NOT:    a. Drive, operate any machinery. b. Drink alcohol.  c. Engage in activities that require mental sharpness or manual dexterity such as cooking. d. Take any medications other than prescribed by a physician.  e. Make any legal or financial decisions. 2. Call this office immediately, if:    a. Onset of new or increase of janet rectal bleeding.  b. Fever > 101  c. Increased abdominal pain    Additional instructions:    a. Diet as recommended  b. Due to risk of dehydration after colon prep and sedation, avoid extended periods of time outdoors if the day is hot and humid. c. If biopsies were taken, you will receive a post card or telephone call with results of your biopsies and suggestion for your next colonoscopy. Please allow us at least 3 weeks to get back with you about your results. If we have not contacted you by the, please call us for results. Ph# [phone]  d. If you had polyp(s) removed DO NOT TAKE NSAIDS (Aspirin, Advil, Aleve, Naproxyn, Ibuprofen, etc) for 2 weeks. Tylenol is OK to use.         DISCHARGE SUMMARY from Nurse    PATIENT INSTRUCTIONS:    After general anesthesia or intravenous sedation, for 24 hours or while taking prescription Narcotics:  · Limit your activities  · Do not drive and operate hazardous machinery  · Do not make important personal or business decisions  · Do  not drink alcoholic beverages  · If you have not urinated within 8 hours after discharge, please contact your surgeon on call.    Report the following to your surgeon:  · Excessive pain, swelling, redness or odor of or around the surgical area  · Temperature over 100.5  · Nausea and vomiting lasting longer than 4 hours or if unable to take medications  · Any signs of decreased circulation or nerve impairment to extremity: change in color, persistent  numbness, tingling, coldness or increase pain  · Any questions    What to do at Home:  Recommended activity: Activity as tolerated and no driving for today    These are general instructions for a healthy lifestyle:    No smoking/ No tobacco products/ Avoid exposure to second hand smoke  Surgeon General's Warning:  Quitting smoking now greatly reduces serious risk to your health. Obesity, smoking, and sedentary lifestyle greatly increases your risk for illness    A healthy diet, regular physical exercise & weight monitoring are important for maintaining a healthy lifestyle    You may be retaining fluid if you have a history of heart failure or if you experience any of the following symptoms:  Weight gain of 3 pounds or more overnight or 5 pounds in a week, increased swelling in our hands or feet or shortness of breath while lying flat in bed. Please call your doctor as soon as you notice any of these symptoms; do not wait until your next office visit. Recognize signs and symptoms of STROKE:    F-face looks uneven    A-arms unable to move or move unevenly    S-speech slurred or non-existent    T-time-call 911 as soon as signs and symptoms begin-DO NOT go       Back to bed or wait to see if you get better-TIME IS BRAIN. Warning Signs of HEART ATTACK     Call 911 if you have these symptoms:   Chest discomfort. Most heart attacks involve discomfort in the center of the chest that lasts more than a few minutes, or that goes away and comes back. It can feel like uncomfortable pressure, squeezing, fullness, or pain.  Discomfort in other areas of the upper body.  Symptoms can include pain or discomfort in one or both arms, the back, neck, jaw, or stomach.  Shortness of breath with or without chest discomfort.  Other signs may include breaking out in a cold sweat, nausea, or lightheadedness. Don't wait more than five minutes to call 911 - MINUTES MATTER! Fast action can save your life. Calling 911 is almost always the fastest way to get lifesaving treatment. Emergency Medical Services staff can begin treatment when they arrive -- up to an hour sooner than if someone gets to the hospital by car. The discharge information has been reviewed with the patient. The patient verbalized understanding. Discharge medications reviewed with the patient and appropriate educational materials and side effects teaching were provided. Patient armband removed and given to patient to take home. Patient was informed of the privacy risks if armband lost or stolen.

## 2019-01-23 NOTE — PERIOP NOTES
Pt transported to Phase II by Renée Ruvalcaba RN and Delmar Garcia CRNA. Pt connected to monitor or stable.  SBAR given to [address]

## 2019-01-23 NOTE — ANESTHESIA POSTPROCEDURE EVALUATION
Procedure(s): ESOPHAGOGASTRODUODENOSCOPY (EGD). Anesthesia Post Evaluation Multimodal analgesia: multimodal analgesia used between 6 hours prior to anesthesia start to PACU discharge Patient location during evaluation: PACU Patient participation: complete - patient participated Level of consciousness: awake Pain score: 0 Pain management: adequate Airway patency: patent Anesthetic complications: no 
Cardiovascular status: acceptable Respiratory status: acceptable Hydration status: acceptable Post anesthesia nausea and vomiting:  none Visit Vitals /73 Pulse 92 Temp 36.8 °C (98.3 °F) Resp 18 Ht 5' 3\" (1.6 m) Wt 67.8 kg (149 lb 7 oz) SpO2 100% Breastfeeding? No  
BMI 26.47 kg/m²

## 2019-06-24 ENCOUNTER — OFFICE VISIT (OUTPATIENT)
Dept: CARDIOLOGY CLINIC | Age: 74
End: 2019-06-24

## 2019-06-24 VITALS
WEIGHT: 149 LBS | BODY MASS INDEX: 26.4 KG/M2 | OXYGEN SATURATION: 97 % | DIASTOLIC BLOOD PRESSURE: 77 MMHG | SYSTOLIC BLOOD PRESSURE: 153 MMHG | HEART RATE: 83 BPM | HEIGHT: 63 IN

## 2019-06-24 DIAGNOSIS — I48.0 PAF (PAROXYSMAL ATRIAL FIBRILLATION) (HCC): ICD-10-CM

## 2019-06-24 DIAGNOSIS — R00.2 PALPITATIONS: Primary | ICD-10-CM

## 2019-06-24 DIAGNOSIS — I77.9 BILATERAL CAROTID ARTERY DISEASE, UNSPECIFIED TYPE (HCC): ICD-10-CM

## 2019-06-24 DIAGNOSIS — I51.89 DIASTOLIC DYSFUNCTION: ICD-10-CM

## 2019-06-24 NOTE — PATIENT INSTRUCTIONS
Jose Arroyo will call to schedule your testing within 48 hours. (Holter monitor) If you do not hear from her, then please call central scheduling at [phone] or [phone] Fausto Pratt) All testing is completed at [address] [place] [address]

## 2019-06-27 NOTE — PROGRESS NOTES
Subjective:      Mauricio Novak is in the office today for cardiac reevaluation. She is a 75year-old woman that was seen at [facility] on 08/21/2017. At that time, she was about to have rotator cuff surgery. She developed atrial fibrillation while in the presurgical area. A short time later, she did spontaneously convert to sinus rhythm. She was given a prescription for low dose beta-blocker in the form of Toprol XL 25 mg a day. In the office today, she reports that she is having problems with her left knee. She has been getting some injections. She has noticed at times a feeling of an \"electricity\" type sensation over her left precordium. It is at times extremely sharp. It has occurred primarily at rest.  She is also had some episodic lightheadedness. Patient Active Problem List    Diagnosis Date Noted    Carcinoid tumor of stomach 12/05/2017    Preoperative clearance 08/31/2017    PAF (paroxysmal atrial fibrillation) (HCC) 35/25/2545    Diastolic dysfunction 26/18/7184    Palpitations 08/31/2017    Carotid disease, bilateral ([facility] [address] 08/31/2017     Current Outpatient Medications   Medication Sig Dispense Refill    cyanocobalamin 1,000 mcg tablet Take 1,000 mcg by mouth daily.  hyaluronate (SYNVISC-ONE) 48 mg/6 mL syrg injection 48 mg by Intra artICUlar route once. Every 6 months per Dr. Magdiel Rogers.  metoprolol succinate (TOPROL-XL) 25 mg XL tablet TAKE 1 TABLET DAILY FOR MULTIFOCAL ATRIAL TACHYCARDIA 90 Tab 3    diclofenac (VOLTAREN) 1 % gel Apply  to affected area as needed.  lidocaine (LIDODERM) 5 % 1 Patch by TransDERmal route as needed.  Cholecalciferol, Vitamin D3, 50,000 unit cap Take 1 Cap by mouth every seven (7) days.  aspirin 81 mg tablet Take 81 mg by mouth.  omega 3-dha-epa-fish oil 100-160-1,000 mg cap Take 1 Can by mouth daily.        No Known Allergies  Past Medical History:   Diagnosis Date    Atrial fibrillation ([facility] [address]     CAD (coronary artery disease)     Carcinoid tumor     Stomach     Past Surgical History:   Procedure Laterality Date    HX APPENDECTOMY      HX HYSTERECTOMY      partial hysterectomy    HX KNEE ARTHROSCOPY Left     HX ORTHOPAEDIC      Bunionectomy     HX OTHER SURGICAL      Tumor removed from left shoulder blade    HX ROTATOR CUFF REPAIR Right     HX TONSILLECTOMY       No family history on file. Social History     Tobacco Use   Smoking Status Former Smoker   Smokeless Tobacco Never Used          Review of Systems, additional:  Constitutional: negative  Eyes: negative  Respiratory: negative  Cardiovascular: positive for palpitations  Gastrointestinal: negative  Musculoskeletal:negative  Neurological: negative  Behvioral/Psych: negative  Endocrine: negative  ENT: negative    Objective:     Visit Vitals  /77   Pulse 83   Ht 5' 3\" (1.6 m)   Wt 149 lb (67.6 kg)   SpO2 97%   BMI 26.39 kg/m²     General:  alert, cooperative, no distress   Chest Wall: inspection normal - no chest wall deformities or tenderness, respiratory effort normal   Lung: clear to auscultation bilaterally   Heart:  normal rate and regular rhythm, S1 and S2 normal, no murmurs noted, no gallops noted   Abdomen: soft, non-tender. Bowel sounds normal. No masses,  no organomegaly   Extremities: extremities normal, atraumatic, no cyanosis or edema Skin: no rashes   Neuro: alert, oriented, normal speech, no focal findings or movement disorder noted     EK2017; Sinus rhythm. Diffuse nondiagnostic ST & T wave abnormalities    Assessment/Plan:       ICD-10-CM ICD-9-CM    1. Atrial fibrillation, unspecified type [facility]), recently having symptoms of \"electricity \"that she feels over her left precordium. Will order Holter monitor. Return in 3 to 4 weeks I48.91 427.31 AMB POC EKG ROUTINE W/ 12 LEADS, INTER & REP   2. PAF (paroxysmal atrial fibrillation) ([facility]) I48.0 427.31    3.  Diastolic dysfunction, echo done ; normal systolic function. Grade 1 diastolic dysfunction. Mild LAE. I51.9 429.9    4. Palpitations, as above R00.2 785.1    5. S/P shoulder surgery      6.  Carotid disease, bilateral (HCC) I77.9 447.9

## 2019-07-02 ENCOUNTER — HOSPITAL ENCOUNTER (OUTPATIENT)
Dept: NON INVASIVE DIAGNOSTICS | Age: 74
Discharge: HOME | End: 2019-07-02
Attending: INTERNAL MEDICINE
Payer: MEDICARE

## 2019-07-02 DIAGNOSIS — R00.2 PALPITATIONS: ICD-10-CM

## 2019-07-02 PROCEDURE — 93226 XTRNL ECG REC<48 HR SCAN A/R: CPT

## 2019-07-24 ENCOUNTER — OFFICE VISIT (OUTPATIENT)
Dept: CARDIOLOGY CLINIC | Age: 74
End: 2019-07-24

## 2019-07-24 VITALS
BODY MASS INDEX: 25.87 KG/M2 | HEIGHT: 63 IN | OXYGEN SATURATION: 98 % | HEART RATE: 89 BPM | WEIGHT: 146 LBS | DIASTOLIC BLOOD PRESSURE: 81 MMHG | SYSTOLIC BLOOD PRESSURE: 151 MMHG

## 2019-07-24 DIAGNOSIS — I77.9 BILATERAL CAROTID ARTERY DISEASE, UNSPECIFIED TYPE (HCC): Primary | ICD-10-CM

## 2019-07-24 DIAGNOSIS — Z98.890 HISTORY OF HOLTER MONITORING: ICD-10-CM

## 2019-07-24 NOTE — PROGRESS NOTES
1. Have you been to the ER, urgent care clinic since your last visit? Hospitalized since your last visit? No    2. Have you seen or consulted any other health care providers outside of the [address] [place] [address] since your last visit? Include any pap smears or colon screening.  No

## 2019-07-24 NOTE — TELEPHONE ENCOUNTER
PCP: Lam Muñoz NP    Last appt: 7/24/2019  No future appointments. Requested Prescriptions     Pending Prescriptions Disp Refills    rosuvastatin (CRESTOR) 10 mg tablet 30 Tab 0     Sig: Take 1 Tab by mouth nightly.

## 2019-07-24 NOTE — TELEPHONE ENCOUNTER
PCP: Reig Paulino NP    Last appt: 7/24/2019  No future appointments. Requested Prescriptions     Pending Prescriptions Disp Refills    rosuvastatin (CRESTOR) 10 mg tablet 90 Tab 3     Sig: Take 1 Tab by mouth nightly.

## 2019-07-25 ENCOUNTER — DOCUMENTATION ONLY (OUTPATIENT)
Dept: CARDIOLOGY CLINIC | Age: 74
End: 2019-07-25

## 2019-07-26 RX ORDER — ROSUVASTATIN CALCIUM 10 MG/1
10 TABLET, COATED ORAL
Qty: 30 TAB | Refills: 0 | Status: SHIPPED | OUTPATIENT
Start: 2019-07-26 | End: 2019-09-25 | Stop reason: SDUPTHER

## 2019-07-26 RX ORDER — ROSUVASTATIN CALCIUM 10 MG/1
10 TABLET, COATED ORAL
Qty: 90 TAB | Refills: 3 | Status: SHIPPED | OUTPATIENT
Start: 2019-07-26 | End: 2020-09-01 | Stop reason: SDUPTHER

## 2019-07-27 PROBLEM — Z98.890 HISTORY OF HOLTER MONITORING: Status: ACTIVE | Noted: 2019-07-27

## 2019-07-27 NOTE — PROGRESS NOTES
Subjective:      Jodi Heredia is in the office today for cardiac reevaluation. She is a 75year-old woman that was seen at [facility] on 08/21/2017. At that time, she was about to have rotator cuff surgery. She developed atrial fibrillation while in the presurgical area. A short time later, she did spontaneously convert to sinus rhythm. She was given a prescription for low dose beta-blocker in the form of Toprol XL 25 mg a day. Holter monitor was recently done. There was no atrial fibrillation. In the office today she says she has had \"a couple of palpitations \". In the office today, she reports that she is very confused about her cholesterol. She was prescribed Crestor by one physician and was advised by another physician not to take the medicine. She has known bilateral carotid disease. She also has unfavorable LDL of 170+. Her ten-year risk was calculated at 26.2%. 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  hyaluronate (SYNVISC-ONE) 48 mg/6 mL syrg injection 48 mg by Intra artICUlar route once. Every 6 months per Dr. Kraig Montoya.  metoprolol succinate (TOPROL-XL) 25 mg XL tablet TAKE 1 TABLET DAILY FOR MULTIFOCAL ATRIAL TACHYCARDIA 90 Tab 3    diclofenac (VOLTAREN) 1 % gel Apply  to affected area as needed.  lidocaine (LIDODERM) 5 % 1 Patch by TransDERmal route as needed.  Cholecalciferol, Vitamin D3, 50,000 unit cap Take 1 Cap by mouth every seven (7) days.  aspirin 81 mg tablet Take 81 mg by mouth.       omega 3-dha-epa-fish oil 100-160-1,000 mg cap Take 1 Can by mouth daily.  rosuvastatin (CRESTOR) 10 mg tablet Take 1 Tab by mouth nightly. 90 Tab 3    rosuvastatin (CRESTOR) 10 mg tablet Take 1 Tab by mouth nightly. 30 Tab 0     No Known Allergies  Past Medical History:   Diagnosis Date    Atrial fibrillation ([address]     CAD (coronary artery disease)     Carcinoid tumor     Stomach     Past Surgical History:   Procedure Laterality Date    HX APPENDECTOMY      HX HYSTERECTOMY      partial hysterectomy    HX KNEE ARTHROSCOPY Left     HX ORTHOPAEDIC      Bunionectomy     HX OTHER SURGICAL      Tumor removed from left shoulder blade    HX ROTATOR CUFF REPAIR Right     HX TONSILLECTOMY       No family history on file. Social History     Tobacco Use   Smoking Status Former Smoker   Smokeless Tobacco Never Used          Review of Systems, additional:  Constitutional: negative  Eyes: negative  Respiratory: negative  Cardiovascular: positive for palpitations  Gastrointestinal: negative  Musculoskeletal:negative  Neurological: negative  Behvioral/Psych: negative  Endocrine: negative  ENT: negative    Objective:     Visit Vitals  /81   Pulse 89   Ht 5' 3\" (1.6 m)   Wt 146 lb (66.2 kg)   SpO2 98%   BMI 25.86 kg/m²     General:  alert, cooperative, no distress   Chest Wall: inspection normal - no chest wall deformities or tenderness, respiratory effort normal   Lung: clear to auscultation bilaterally   Heart:  normal rate and regular rhythm, S1 and S2 normal, no murmurs noted, no gallops noted   Abdomen: soft, non-tender. Bowel sounds normal. No masses,  no organomegaly   Extremities: extremities normal, atraumatic, no cyanosis or edema Skin: no rashes   Neuro: alert, oriented, normal speech, no focal findings or movement disorder noted     EK2017; Sinus rhythm. Diffuse nondiagnostic ST & T wave abnormalities    Assessment/Plan:       ICD-10-CM ICD-9-CM    1.  Atrial fibrillation, unspecified type ([address] recently having symptoms of \"electricity \"that she feels over her left precordium. Holter monitor completed with results below. .  Return in 8 weeks I48.91 427.31 AMB POC EKG ROUTINE W/ 12 LEADS, INTER & REP   2. PAF (paroxysmal atrial fibrillation) (HCC) I48.0 427.31    3. Diastolic dysfunction, echo done 6/55/2187; normal systolic function. Grade 1 diastolic dysfunction. Mild LAE. I51.9 429.9    4. Palpitations, as above R00.2 785.1    5. S/P shoulder surgery      6. Carotid disease, bilateral ([facility] [address] will repeat PVLs of the carotids. LICA 50 to 85%, less than 50% ICA in the past. I77.9 447.9    7       Dyslipidemia, patient has a 10-year risk of cardiovascular events at 26.2%.   Will restart Crestor 10 mg daily

## 2019-08-29 ENCOUNTER — HOSPITAL ENCOUNTER (OUTPATIENT)
Dept: VASCULAR SURGERY | Age: 74
Discharge: HOME | End: 2019-08-29
Attending: INTERNAL MEDICINE
Payer: MEDICARE

## 2019-08-29 DIAGNOSIS — I77.9 BILATERAL CAROTID ARTERY DISEASE, UNSPECIFIED TYPE (HCC): ICD-10-CM

## 2019-08-29 PROCEDURE — 93880 EXTRACRANIAL BILAT STUDY: CPT

## 2019-08-30 LAB
LEFT CCA DIST DIAS: 24.3 CM/S
LEFT CCA DIST SYS: 96.2 CM/S
LEFT CCA MID DIAS: 24.3 CM/S
LEFT CCA MID SYS: 96.2 CM/S
LEFT CCA PROX DIAS: 21.1 CM/S
LEFT CCA PROX SYS: 86.4 CM/S
LEFT ECA DIAS: 24.3 CM/S
LEFT ECA SYS: 138.7 CM/S
LEFT ICA DIST DIAS: 17.2 CM/S
LEFT ICA DIST SYS: 51.7 CM/S
LEFT ICA MID DIAS: 21.9 CM/S
LEFT ICA MID SYS: 73.4 CM/S
LEFT ICA PROX DIAS: 51.4 CM/S
LEFT ICA PROX SYS: 146.4 CM/S
LEFT ICA/CCA SYS: 1.5
LEFT SUBCLAVIAN DIAS: 0 CM/S
LEFT SUBCLAVIAN SYS: 71.6 CM/S
LEFT VERTEBRAL DIAS: 16.6 CM/S
LEFT VERTEBRAL SYS: 53.9 CM/S
RIGHT CCA DIST DIAS: 27.3 CM/S
RIGHT CCA DIST SYS: 80.7 CM/S
RIGHT CCA MID DIAS: 26 CM/S
RIGHT CCA MID SYS: 78.1 CM/S
RIGHT CCA PROX DIAS: 23.3 CM/S
RIGHT CCA PROX SYS: 82 CM/S
RIGHT ECA DIAS: 20.9 CM/S
RIGHT ECA SYS: 105 CM/S
RIGHT ICA DIST DIAS: 20.8 CM/S
RIGHT ICA DIST SYS: 70.5 CM/S
RIGHT ICA MID DIAS: 25.4 CM/S
RIGHT ICA MID SYS: 89.3 CM/S
RIGHT ICA PROX DIAS: 16.6 CM/S
RIGHT ICA PROX SYS: 54.5 CM/S
RIGHT ICA/CCA SYS: 1.1
RIGHT SUBCLAVIAN DIAS: 0 CM/S
RIGHT SUBCLAVIAN SYS: 106 CM/S
RIGHT VERTEBRAL DIAS: 11.2 CM/S
RIGHT VERTEBRAL SYS: 42.4 CM/S

## 2019-09-12 ENCOUNTER — TELEPHONE (OUTPATIENT)
Dept: CARDIOLOGY CLINIC | Age: 74
End: 2019-09-12

## 2019-09-12 NOTE — TELEPHONE ENCOUNTER
Patient calling for results     Message   Received:  Today   Message Contents   SJ Valverde             Results are unchanged compared to her last report at [facility], thanks     Patient aware of results

## 2019-09-25 ENCOUNTER — OFFICE VISIT (OUTPATIENT)
Dept: CARDIOLOGY CLINIC | Age: 74
End: 2019-09-25

## 2019-09-25 VITALS
OXYGEN SATURATION: 98 % | WEIGHT: 145 LBS | DIASTOLIC BLOOD PRESSURE: 82 MMHG | SYSTOLIC BLOOD PRESSURE: 154 MMHG | HEART RATE: 89 BPM | BODY MASS INDEX: 25.69 KG/M2

## 2019-09-25 DIAGNOSIS — I48.0 PAF (PAROXYSMAL ATRIAL FIBRILLATION) (HCC): Primary | ICD-10-CM

## 2019-09-25 NOTE — PROGRESS NOTES
1. Have you been to the ER, urgent care clinic since your last visit? Hospitalized since your last visit? No     2. Have you seen or consulted any other health care providers outside of the [address] [place] [address] since your last visit? Include any pap smears or colon screening.   No

## 2019-09-28 NOTE — PROGRESS NOTES
Subjective:      Constance Quiñonez is in the office today for cardiac reevaluation. She is a 75year-old woman that was seen at [facility] [place] on 08/21/2017. At that time, she was about to have rotator cuff surgery. She developed atrial fibrillation while in the presurgical area. A short time later, she did spontaneously convert to sinus rhythm. She was given a prescription for low dose beta-blocker in the form of Toprol XL 25 mg a day. Holter monitor was recently done. There was no atrial fibrillation. In the office today, she reports that she is doing well. Had a recent MRI of the abdomen demonstrated some small cystic lesions in the pancreas. There was no evidence of a duodenal or pancreatic gastrinoma. The test was done because of a reported history of abdominal carcinoid tumor. She has had no chest pain or shortness of breath. She has had no palpitations, near syncope or syncope. 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 90 Tab 3    cyanocobalamin 1,000 mcg tablet Take 1,000 mcg by mouth daily.  hyaluronate (SYNVISC-ONE) 48 mg/6 mL syrg injection 48 mg by Intra artICUlar route once. Every 6 months per Dr. Arturo Riddle.  metoprolol succinate (TOPROL-XL) 25 mg XL tablet TAKE 1 TABLET DAILY FOR MULTIFOCAL ATRIAL TACHYCARDIA 90 Tab 3    diclofenac (VOLTAREN) 1 % gel Apply  to affected area as needed.  lidocaine (LIDODERM) 5 % 1 Patch by TransDERmal route as needed.       Cholecalciferol, Vitamin D3, 50,000 unit cap Take 1 Cap by mouth every seven (7) days.  aspirin 81 mg tablet Take 81 mg by mouth.  omega 3-dha-epa-fish oil 100-160-1,000 mg cap Take 1 Can by mouth daily. No Known Allergies  Past Medical History:   Diagnosis Date    Atrial fibrillation ([facility]     CAD (coronary artery disease)     Carcinoid tumor     Stomach     Past Surgical History:   Procedure Laterality Date    HX APPENDECTOMY      HX HYSTERECTOMY      partial hysterectomy    HX KNEE ARTHROSCOPY Left     HX ORTHOPAEDIC      Bunionectomy     HX OTHER SURGICAL      Tumor removed from left shoulder blade    HX ROTATOR CUFF REPAIR Right     HX TONSILLECTOMY       No family history on file. Social History     Tobacco Use   Smoking Status Former Smoker   Smokeless Tobacco Never Used          Review of Systems, additional:  Constitutional: negative  Eyes: negative  Respiratory: negative  Cardiovascular: positive for palpitations  Gastrointestinal: negative  Musculoskeletal:negative  Neurological: negative  Behvioral/Psych: negative  Endocrine: negative  ENT: negative    Objective:     Visit Vitals  /82   Pulse 89   Wt 65.8 kg (145 lb)   SpO2 98%   BMI 25.69 kg/m²     General:  alert, cooperative, no distress   Chest Wall: inspection normal - no chest wall deformities or tenderness, respiratory effort normal   Lung: clear to auscultation bilaterally   Heart:  normal rate and regular rhythm, S1 and S2 normal, no murmurs noted, no gallops noted   Abdomen: soft, non-tender. Bowel sounds normal. No masses,  no organomegaly   Extremities: extremities normal, atraumatic, no cyanosis or edema Skin: no rashes   Neuro: alert, oriented, normal speech, no focal findings or movement disorder noted     EK2017; Sinus rhythm. Diffuse nondiagnostic ST & T wave abnormalities    Assessment/Plan:       ICD-10-CM ICD-9-CM    1. Atrial fibrillation, unspecified type ([facility] stable I48.91 427.31 AMB POC EKG ROUTINE W/ 12 LEADS, INTER & REP   2.  PAF (paroxysmal atrial fibrillation) ([facility] I48.0 427.31    3. Diastolic dysfunction, echo done 1/15/7680; normal systolic function. Grade 1 diastolic dysfunction. Mild LAE. I51.9 429.9    4. Palpitations, as above R00.2 785.1    5. S/P shoulder surgery      6. Carotid disease, bilateral ([facility]  repeat PVLs of the carotids done on 8/29/2019. Shantell Forte LICA 50 to 60%, less than 50% RUBA which is essentially unchanged compared to prior study. I77.9 447.9    7       Dyslipidemia, patient has a 10-year risk of cardiovascular events at 26.2%. Patient taking Crestor 10 mg daily.   She will have a lipid profile done tomorrow with her PCP

## 2020-02-05 RX ORDER — METOPROLOL SUCCINATE 25 MG/1
TABLET, EXTENDED RELEASE ORAL
Qty: 90 TAB | Refills: 4 | Status: SHIPPED | OUTPATIENT
Start: 2020-02-05 | End: 2021-04-30

## 2020-06-03 ENCOUNTER — OFFICE VISIT (OUTPATIENT)
Dept: CARDIOLOGY CLINIC | Age: 75
End: 2020-06-03

## 2020-06-03 VITALS
BODY MASS INDEX: 25.52 KG/M2 | SYSTOLIC BLOOD PRESSURE: 157 MMHG | OXYGEN SATURATION: 97 % | DIASTOLIC BLOOD PRESSURE: 85 MMHG | HEIGHT: 63 IN | HEART RATE: 85 BPM | WEIGHT: 144 LBS | TEMPERATURE: 98 F

## 2020-06-03 DIAGNOSIS — I51.89 DIASTOLIC DYSFUNCTION: ICD-10-CM

## 2020-06-03 DIAGNOSIS — R00.2 PALPITATIONS: ICD-10-CM

## 2020-06-03 DIAGNOSIS — I77.9 BILATERAL CAROTID ARTERY DISEASE, UNSPECIFIED TYPE (HCC): ICD-10-CM

## 2020-06-03 DIAGNOSIS — I48.0 PAF (PAROXYSMAL ATRIAL FIBRILLATION) (HCC): Primary | ICD-10-CM

## 2020-06-03 NOTE — LETTER
6/3/2020 11:21 AM 
 
Ms. [address] [facility] [id number] Lien Banks was seen in our office on 6/3/2020 for cardiac evaluation. From a cardiac standpoint she is cleared for cataract surgery with Dr. Adrianne Escoto ([address] [place] [address],[address]) Please feel free to contact our office if you have any questions regarding this patient. Sincerely, Maricarmen Nolasco MD

## 2020-06-03 NOTE — PROGRESS NOTES
Restrepo Hush presents today for   Chief Complaint   Patient presents with    Surgical Clearance     cataract left eye       Restrepo Hush preferred language for health care discussion is english/other. Is someone accompanying this pt?  no    Is the patient using any DME equipment during [address]? no    Depression Screening:  3 most recent PHQ Screens 6/3/2020   Little interest or pleasure in doing things Not at all   Feeling down, depressed, irritable, or hopeless Not at all   Total Score PHQ 2 0       Learning Assessment:  Learning Assessment 7/24/2019   PRIMARY LEARNER Patient   PRIMARY LANGUAGE ENGLISH   LEARNER PREFERENCE PRIMARY READING   ANSWERED BY pt   RELATIONSHIP SELF       Abuse Screening:  No flowsheet data found. Fall Risk  Fall Risk Assessment, last 12 mths 6/3/2020   Able to walk? Yes   Fall in past 12 months? No       Pt currently taking Anticoagulant therapy? No     Coordination of Care:  1. Have you been to the ER, urgent care clinic since your last visit? Hospitalized since your last visit? no    2. Have you seen or consulted any other health care providers outside of the [address] [place] [address] since your last visit? Include any pap smears or colon screening.   yes

## 2020-06-11 NOTE — PROGRESS NOTES
Subjective:      John Ott is in the office today for cardiac reevaluation. She is a 75year-old woman that was seen at [facility] on 08/21/2017. At that time, she was about to have rotator cuff surgery. She developed atrial fibrillation while in the presurgical area. A short time later, she did spontaneously convert to sinus rhythm. She was given a prescription for low dose beta-blocker in the form of Toprol XL 25 mg a day. Holter monitor was recently done. There was no atrial fibrillation. In mid to late 2019, she had a  MRI of the abdomen that demonstrated some small cystic lesions in the pancreas. There was no evidence of a duodenal or pancreatic gastrinoma. The test was done because of a reported history of abdominal carcinoid tumor. In the office today, she reports that she is having cataract surgery in the near future. She has had no chest pain or shortness of breath. She has had no palpitations, near-syncope or syncope. She is mostly sedentary. 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 90 Tab 3    cyanocobalamin 1,000 mcg tablet Take 1,000 mcg by mouth daily.  diclofenac (VOLTAREN) 1 % gel Apply  to affected area as needed.  lidocaine (LIDODERM) 5 % 1 Patch by TransDERmal route as needed.  Cholecalciferol, Vitamin D3, 50,000 unit cap Take 1 Cap by mouth every seven (7) days.  aspirin 81 mg tablet Take 81 mg by mouth.  omega 3-dha-epa-fish oil 100-160-1,000 mg cap Take 1 Can by mouth daily. No Known Allergies  Past Medical History:   Diagnosis Date    Atrial fibrillation ([address]     CAD (coronary artery disease)     Carcinoid tumor     Stomach     Past Surgical History:   Procedure Laterality Date    HX APPENDECTOMY      HX HYSTERECTOMY      partial hysterectomy    HX KNEE ARTHROSCOPY Left     HX ORTHOPAEDIC      Bunionectomy     HX OTHER SURGICAL      Tumor removed from left shoulder blade    HX ROTATOR CUFF REPAIR Right     HX TONSILLECTOMY       No family history on file. Social History     Tobacco Use   Smoking Status Former Smoker   Smokeless Tobacco Never Used          Review of Systems, additional:  Constitutional: negative  Eyes: negative  Respiratory: negative  Cardiovascular: positive for palpitations  Gastrointestinal: negative  Musculoskeletal:negative  Neurological: negative  Behvioral/Psych: negative  Endocrine: negative  ENT: negative    Objective:     Visit Vitals  /85   Pulse 85   Temp 98 °F (36.7 °C) (Oral)   Ht 5' 3\" (1.6 m)   Wt 65.3 kg (144 lb)   SpO2 97%   BMI 25.51 kg/m²     General:  alert, cooperative, no distress   Chest Wall: inspection normal - no chest wall deformities or tenderness, respiratory effort normal   Lung: clear to auscultation bilaterally   Heart:  normal rate and regular rhythm, S1 and S2 normal, no murmurs noted, no gallops noted   Abdomen: soft, non-tender. Bowel sounds normal. No masses,  no organomegaly   Extremities: extremities normal, atraumatic, no cyanosis or edema Skin: no rashes   Neuro: alert, oriented, normal speech, no focal findings or movement disorder noted     EK2017; Sinus rhythm. Diffuse nondiagnostic ST & T wave abnormalities    Assessment/Plan:       ICD-10-CM ICD-9-CM           1. PAF (paroxysmal atrial fibrillation) ([facility]) I48.0 427.31    2. . Diastolic dysfunction, echo done ; normal systolic function.  Grade 1 diastolic dysfunction. Mild LAE. I51.9 429.9    3. Palpitations, as above R00.2 785.1    4. S/P shoulder surgery      5. Carotid disease, bilateral ([address]  repeat PVLs of the carotids done on 8/29/2019. January Money LICA 50 to 05%, less than 50% RUBA which is essentially unchanged compared to prior study. I77.9 447.9    6. Dyslipidemia, patient has a 10-year risk of cardiovascular events at 26.2%. Patient taking Crestor 10 mg daily. 7.      Preoperative clearance, the patient is scheduled for cataract surgery. She has had no recent symptoms of anginal chest pain, symptoms of congestive heart failure or symptoms of an unstable cardiac rhythm. No cardiac contraindication to proposed surgical procedure.   Return in 6 months normal

## 2020-09-02 RX ORDER — ROSUVASTATIN CALCIUM 10 MG/1
10 TABLET, COATED ORAL
Qty: 90 TAB | Refills: 3 | Status: SHIPPED | OUTPATIENT
Start: 2020-09-02 | End: 2020-10-13 | Stop reason: SDUPTHER

## 2020-10-13 NOTE — TELEPHONE ENCOUNTER
PCP: Marcelina Harris NP    Last appt: Visit date not found  Future Appointments   Date Time Provider Rosalina Marroquin   12/4/2020  9:30 AM Jojo Gardner MD [facility] BS AMB       Requested Prescriptions     Pending Prescriptions Disp Refills    rosuvastatin (CRESTOR) 10 mg tablet 90 Tab 3     Sig: Take 1 Tab by mouth nightly. Other Comments:  [facility]

## 2020-10-14 RX ORDER — ROSUVASTATIN CALCIUM 10 MG/1
10 TABLET, COATED ORAL
Qty: 90 TAB | Refills: 3 | Status: SHIPPED | OUTPATIENT
Start: 2020-10-14

## 2021-04-30 RX ORDER — METOPROLOL SUCCINATE 25 MG/1
TABLET, EXTENDED RELEASE ORAL
Qty: 90 TAB | Refills: 3 | Status: SHIPPED | OUTPATIENT
Start: 2021-04-30 | End: 2022-04-28

## 2022-03-18 PROBLEM — I77.9 CAROTID DISEASE, BILATERAL (HCC): Status: ACTIVE | Noted: 2017-08-31

## 2022-03-18 PROBLEM — I51.89 DIASTOLIC DYSFUNCTION: Status: ACTIVE | Noted: 2017-08-31

## 2022-03-19 PROBLEM — Z98.890 HISTORY OF HOLTER MONITORING: Status: ACTIVE | Noted: 2019-07-27

## 2022-03-19 PROBLEM — Z01.818 PREOPERATIVE CLEARANCE: Status: ACTIVE | Noted: 2017-08-31

## 2022-03-19 PROBLEM — I48.0 PAF (PAROXYSMAL ATRIAL FIBRILLATION) (HCC): Status: ACTIVE | Noted: 2017-08-31

## 2022-03-20 PROBLEM — D3A.092 CARCINOID TUMOR OF STOMACH: Status: ACTIVE | Noted: 2017-12-05

## 2022-03-20 PROBLEM — R00.2 PALPITATIONS: Status: ACTIVE | Noted: 2017-08-31

## 2022-04-28 RX ORDER — METOPROLOL SUCCINATE 25 MG/1
TABLET, EXTENDED RELEASE ORAL
Qty: 90 TABLET | Refills: 3 | Status: SHIPPED | OUTPATIENT
Start: 2022-04-28

## 2024-06-14 ENCOUNTER — OFFICE VISIT (OUTPATIENT)
Age: 79
End: 2024-06-14

## 2024-06-14 VITALS
OXYGEN SATURATION: 97 % | HEART RATE: 62 BPM | SYSTOLIC BLOOD PRESSURE: 166 MMHG | RESPIRATION RATE: 16 BRPM | DIASTOLIC BLOOD PRESSURE: 74 MMHG | HEIGHT: 62 IN | TEMPERATURE: 97.3 F | BODY MASS INDEX: 24.66 KG/M2 | WEIGHT: 134 LBS

## 2024-06-14 DIAGNOSIS — I65.23 BILATERAL CAROTID ARTERY OCCLUSION: ICD-10-CM

## 2024-06-14 DIAGNOSIS — Z79.01 LONG TERM (CURRENT) USE OF ANTICOAGULANTS: ICD-10-CM

## 2024-06-14 DIAGNOSIS — R00.2 PALPITATIONS: ICD-10-CM

## 2024-06-14 DIAGNOSIS — Z12.11 ENCOUNTER FOR SCREENING COLONOSCOPY: ICD-10-CM

## 2024-06-14 DIAGNOSIS — I51.89 DIASTOLIC DYSFUNCTION: ICD-10-CM

## 2024-06-14 DIAGNOSIS — I48.0 PAROXYSMAL ATRIAL FIBRILLATION (HCC): Primary | ICD-10-CM

## 2024-06-14 DIAGNOSIS — R01.1 SYSTOLIC MURMUR: ICD-10-CM

## 2024-06-14 ASSESSMENT — ENCOUNTER SYMPTOMS
GASTROINTESTINAL NEGATIVE: 1
ALLERGIC/IMMUNOLOGIC NEGATIVE: 1
SHORTNESS OF BREATH: 0
EYES NEGATIVE: 1

## 2024-06-14 ASSESSMENT — LIFESTYLE VARIABLES: HOW MANY STANDARD DRINKS CONTAINING ALCOHOL DO YOU HAVE ON A TYPICAL DAY: PATIENT DOES NOT DRINK

## 2024-06-14 NOTE — PROGRESS NOTES
Lois Gonzalez (:  1945) is a 78 y.o. female,Established patient, here for evaluation of the following chief complaint(s):  Follow-up (6-Month F/U)    Subjective   SUBJECTIVE/OBJECTIVE:  HPI  Patient presents today as a follow-up patient evaluation. She has a history of hypercholesterolemia. She states that she does not have a true history of hypertension, although it has been noted in her past records. Patient states, a few months, she started having episodes of feeling like her heart beats in her ear. She notices this more at night when she is lying down in certain positions. She has also had an issue with palpitations that occur when she is ambulating as well. No syncope. No orthopnea. Her symptoms originally were more with exertion with no chest discomfort.  Patient eventually was discovered to be in atrial fibrillation and she underwent treatment with a subsequent cardioversion.  Since that time, she has done well in general.  She still has some malaise and fatigue but otherwise stable.  She does have a fear of being at home alone and wants to have her son moved in with her.  Blood pressures are elevated today but she is also quite nervous and anxious.      No history of known coronary disease or systolic heart failure.           I have carefully reviewed all available medical records, previous office notes, labs, x-rays, and procedure reports.     Past Medical History:   Diagnosis Date    Atrial fibrillation (HCC)     CAD (coronary artery disease)     Carcinoid tumor     Stomach        Past Surgical History:   Procedure Laterality Date    APPENDECTOMY      HYSTERECTOMY (CERVIX STATUS UNKNOWN)      partial hysterectomy    KNEE ARTHROSCOPY Left     ORTHOPEDIC SURGERY      Bunionectomy     OTHER SURGICAL HISTORY      Tumor removed from left shoulder blade    ROTATOR CUFF REPAIR Right     TONSILLECTOMY          Not on File     Current Outpatient Medications   Medication Sig Dispense Refill

## 2024-06-14 NOTE — PROGRESS NOTES
1. \"Have you been to the ER, urgent care clinic since your last visit?  Hospitalized since your last visit?\" Reviewed by Dr. Harshad Hong    2. \"Have you seen or consulted any other health care providers outside of the [facility] since your last visit?\" Reviewed by Dr. Harshad Hong

## 2024-07-30 ENCOUNTER — TELEPHONE (OUTPATIENT)
Age: 79
End: 2024-07-30

## 2024-07-30 NOTE — TELEPHONE ENCOUNTER
Voicemail left inquiring about a letter request she asked Dr. Hong to write to her landlord stating it is necessary for her son to live with her, due to her heart condition, in order to be approved for another apartment.    Per Dr. Hong he encourages her to seek assistance through her pcp for that letter if she is asking for it right now, he doesn't have the time with so many patients in the hospital, which was told to her right after her appointment with him on 2024.    Called and spoke with Lois Gonzalez two pt identifiers verified, name and . Encouraged Ms. Jhaveri to seek assistance though her pcp. Ms. Gonzalez states, \"I will do that.\"

## 2024-10-08 ENCOUNTER — TELEPHONE (OUTPATIENT)
Age: 79
End: 2024-10-08

## 2024-10-08 NOTE — TELEPHONE ENCOUNTER
Pt left vm that [facility] is delayed in shipping her Eliquis. Can we please call her in 20 tabs to [facility] in [place]. She can be reached at [phone].

## 2024-10-08 NOTE — TELEPHONE ENCOUNTER
Called patient to let her know that I sent the tablets to her pharmacy as requested.  She told me that she just received her Eliquis today and no longer needs it.  The prescription has already been sent so she can use this another time if there is a delay with shipping if she needs it.    Susan Wolff PA-C  4:47 PM     I personally spent

## 2024-11-08 RX ORDER — SOTALOL HYDROCHLORIDE 120 MG/1
120 TABLET ORAL 2 TIMES DAILY
Qty: 180 TABLET | Refills: 3 | Status: SHIPPED | OUTPATIENT
Start: 2024-11-08

## 2024-12-05 NOTE — TELEPHONE ENCOUNTER
PCP: Georgiana Henrandez, JUANJO - NP    Last appt:  6/14/2024   Future Appointments   Date Time Provider Department Center   1/7/2025 10:30 AM [facility] ECHO 1 [facility]   1/14/2025  9:45 AM Harshad Hong Sr., MD [facility] AMB       Requested Prescriptions     Pending Prescriptions Disp Refills    apixaban (ELIQUIS) 5 MG TABS tablet [Pharmacy Med Name: ELIQUIS TABS 5MG] 180 tablet 2     Sig: Take 1 tablet by mouth 2 times daily       Request for a 30 or 90 day supply? Provider Discretion    Pharmacy: confirmed    Other Comments:n/a

## 2024-12-10 ENCOUNTER — TELEPHONE (OUTPATIENT)
Age: 79
End: 2024-12-10

## 2024-12-10 NOTE — TELEPHONE ENCOUNTER
Incoming from patient. Two patient identifiers confirmed. Patient requesting medication refill.    PCP: Georgiana Hernandez APRN - NP  Last appt:  6/14/2024   Future Appointments   Date Time Provider Department Center   1/7/2025 10:30 AM BS [facility] ECHO 1 [facility] AMB   1/14/2025  9:45 AM Harshad Hong Sr., MD [facility] AMB       Medication requested: Eliquis 5mg    Pharmacy: [facility]    Called for a refill at [facility] and said it was canceled by juan

## 2024-12-11 NOTE — TELEPHONE ENCOUNTER
Called and spoke with Lois Gonzalez, two pt identifiers verified, name and . I informed patient [facility] was [facility] on 2024 and recepit confirmed by pharmacy 2024 3:35 pm. Lois Gonzalez voiced understanding.

## 2025-01-03 DIAGNOSIS — R00.2 PALPITATIONS: ICD-10-CM

## 2025-01-03 DIAGNOSIS — I48.0 PAROXYSMAL A-FIB (HCC): Primary | ICD-10-CM

## 2025-01-03 DIAGNOSIS — R01.1 SYSTOLIC MURMUR: ICD-10-CM

## 2025-01-14 ENCOUNTER — OFFICE VISIT (OUTPATIENT)
Age: 80
End: 2025-01-14
Payer: MEDICARE

## 2025-01-14 VITALS
TEMPERATURE: 97.3 F | HEART RATE: 63 BPM | OXYGEN SATURATION: 98 % | DIASTOLIC BLOOD PRESSURE: 60 MMHG | HEIGHT: 62 IN | SYSTOLIC BLOOD PRESSURE: 122 MMHG | WEIGHT: 134 LBS | BODY MASS INDEX: 24.66 KG/M2

## 2025-01-14 DIAGNOSIS — I65.23 BILATERAL CAROTID ARTERY OCCLUSION: ICD-10-CM

## 2025-01-14 DIAGNOSIS — I51.89 DIASTOLIC DYSFUNCTION: ICD-10-CM

## 2025-01-14 DIAGNOSIS — I48.0 PAROXYSMAL ATRIAL FIBRILLATION (HCC): Primary | ICD-10-CM

## 2025-01-14 DIAGNOSIS — R00.2 PALPITATIONS: ICD-10-CM

## 2025-01-14 DIAGNOSIS — M54.50 ACUTE BILATERAL LOW BACK PAIN WITHOUT SCIATICA: ICD-10-CM

## 2025-01-14 DIAGNOSIS — R01.1 SYSTOLIC MURMUR: ICD-10-CM

## 2025-01-14 DIAGNOSIS — Z79.01 LONG TERM (CURRENT) USE OF ANTICOAGULANTS: ICD-10-CM

## 2025-01-14 PROCEDURE — G8399 PT W/DXA RESULTS DOCUMENT: HCPCS | Performed by: INTERNAL MEDICINE

## 2025-01-14 PROCEDURE — 1126F AMNT PAIN NOTED NONE PRSNT: CPT | Performed by: INTERNAL MEDICINE

## 2025-01-14 PROCEDURE — 99215 OFFICE O/P EST HI 40 MIN: CPT | Performed by: INTERNAL MEDICINE

## 2025-01-14 PROCEDURE — 1159F MED LIST DOCD IN RCRD: CPT | Performed by: INTERNAL MEDICINE

## 2025-01-14 PROCEDURE — G8420 CALC BMI NORM PARAMETERS: HCPCS | Performed by: INTERNAL MEDICINE

## 2025-01-14 PROCEDURE — 93000 ELECTROCARDIOGRAM COMPLETE: CPT | Performed by: INTERNAL MEDICINE

## 2025-01-14 PROCEDURE — G8427 DOCREV CUR MEDS BY ELIG CLIN: HCPCS | Performed by: INTERNAL MEDICINE

## 2025-01-14 PROCEDURE — 1160F RVW MEDS BY RX/DR IN RCRD: CPT | Performed by: INTERNAL MEDICINE

## 2025-01-14 PROCEDURE — 1090F PRES/ABSN URINE INCON ASSESS: CPT | Performed by: INTERNAL MEDICINE

## 2025-01-14 PROCEDURE — 1123F ACP DISCUSS/DSCN MKR DOCD: CPT | Performed by: INTERNAL MEDICINE

## 2025-01-14 PROCEDURE — 1036F TOBACCO NON-USER: CPT | Performed by: INTERNAL MEDICINE

## 2025-01-14 RX ORDER — GABAPENTIN 100 MG/1
100 CAPSULE ORAL 3 TIMES DAILY
COMMUNITY
Start: 2025-01-13

## 2025-01-14 ASSESSMENT — ENCOUNTER SYMPTOMS
EYES NEGATIVE: 1
ALLERGIC/IMMUNOLOGIC NEGATIVE: 1
SHORTNESS OF BREATH: 0
GASTROINTESTINAL NEGATIVE: 1

## 2025-01-14 ASSESSMENT — PATIENT HEALTH QUESTIONNAIRE - PHQ9
SUM OF ALL RESPONSES TO PHQ QUESTIONS 1-9: 0
SUM OF ALL RESPONSES TO PHQ9 QUESTIONS 1 & 2: 0
SUM OF ALL RESPONSES TO PHQ QUESTIONS 1-9: 0
2. FEELING DOWN, DEPRESSED OR HOPELESS: NOT AT ALL
1. LITTLE INTEREST OR PLEASURE IN DOING THINGS: NOT AT ALL
SUM OF ALL RESPONSES TO PHQ QUESTIONS 1-9: 0
SUM OF ALL RESPONSES TO PHQ QUESTIONS 1-9: 0

## 2025-01-14 NOTE — PROGRESS NOTES
1. \"Have you been to the ER, urgent care clinic since your last visit?  Hospitalized since your last visit?\" Reviewed by Dr. Harshad Hong     2. \"Have you seen or consulted any other health care providers outside of the [facility] since your last visit?\" Reviewed by Dr. Harshad Hong    
issue.    MEDICATIONS  Current: Tylenol, Voltaren cream, sotalol, Eliquis    IMMUNIZATIONS  She has not received the shingles vaccine.    I have carefully reviewed all available medical records, previous office notes, lab, x-ray and procedure reports    Past Medical History:   Diagnosis Date    Atrial fibrillation (HCC)     CAD (coronary artery disease)     Carcinoid tumor     Stomach        Past Surgical History:   Procedure Laterality Date    APPENDECTOMY      HYSTERECTOMY (CERVIX STATUS UNKNOWN)      partial hysterectomy    KNEE ARTHROSCOPY Left     ORTHOPEDIC SURGERY      Bunionectomy     OTHER SURGICAL HISTORY      Tumor removed from left shoulder blade    ROTATOR CUFF REPAIR Right     TONSILLECTOMY          No Known Allergies     Current Outpatient Medications   Medication Sig Dispense Refill    gabapentin (NEURONTIN) 100 MG capsule Take 1 capsule by mouth 3 times daily.      apixaban (ELIQUIS) 5 MG TABS tablet Take 1 tablet by mouth 2 times daily 180 tablet 2    sotalol (BETAPACE) 120 MG tablet TAKE 1 TABLET TWICE A  tablet 3    vitamin D (CHOLECALCIFEROL) 24172 UNIT CAPS Take 1 capsule by mouth every 7 days      diclofenac sodium (VOLTAREN) 1 % GEL Apply topically as needed      lidocaine (LIDODERM) 5 % Place 1 patch onto the skin as needed      rosuvastatin (CRESTOR) 10 MG tablet Take 1 tablet by mouth       No current facility-administered medications for this visit.        Social History     Tobacco Use    Smoking status: Former    Smokeless tobacco: Never   Vaping Use    Vaping status: Never Used   Substance Use Topics    Alcohol use: No    Drug use: No        History reviewed. No pertinent family history.     Review of Systems   Constitutional: Negative.    HENT: Negative.     Eyes: Negative.    Respiratory:  Negative for shortness of breath.    Cardiovascular:  Negative for chest pain and palpitations.   Gastrointestinal: Negative.    Endocrine: Negative.    Genitourinary: Negative.

## 2025-01-23 ENCOUNTER — TELEPHONE (OUTPATIENT)
Age: 80
End: 2025-01-23

## 2025-01-23 NOTE — TELEPHONE ENCOUNTER
Lois Gonzalez,two pt identifiers verified, name and . Patient called office wanting to know her xray results and would like for her results to be faxed to her PCP.  She stated she is not sign up with the patient portal due not knowing how to work it. I informed patient Dr. Hong has to review her results and then it will be released to view. She stated, she went to her PCP yesterday and  she was not able to see the results.Patient would like a call back with her results once Dr. Hong has signed off on them.   Lois Gonzalez voiced understanding.

## (undated) DEVICE — UNIV CANN 5MM/76MM LTX FREE (10) BLUE

## (undated) DEVICE — SYSTEM PRP 60CC USE OF CNTRFUG FOR PLT RICH PLSM ACCELERATE

## (undated) DEVICE — 4.5 MM FULL RADIUS STRAIGHT                                    BLADES, POWER/EP-1, YELLOW, PACKAGED                                    6 PER BOX, STERILE: Brand: DYONICS

## (undated) DEVICE — STAPLER SKIN H3.9MM WIRE DIA0.58MM CRWN 6.9MM 35 STPL ROT

## (undated) DEVICE — BITE BLK ENDOSCP AD 54FR GRN POLYETH ENDOSCP W STRP SLD

## (undated) DEVICE — CANNULA THREADED FLEX 8.0 X 72MM: Brand: CLEAR-TRAC

## (undated) DEVICE — KENDALL 500 SERIES DIAPHORETIC FOAM MONITORING ELECTRODE - TEAR DROP SHAPE ( 30/PK): Brand: KENDALL

## (undated) DEVICE — FORCEPS BX L240CM JAW DIA2.8MM L CAP W/ NDL MIC MESH TOOTH

## (undated) DEVICE — 3M™ STERI-DRAPE™ U-DRAPE 1015: Brand: STERI-DRAPE™

## (undated) DEVICE — CANNULA THREADED FLEX 6.5 X 72MM: Brand: CLEAR-TRAC

## (undated) DEVICE — PREP CHLORAPREP 10.5 ML ORG --

## (undated) DEVICE — APPLICATOR BLENDING TIP PUR

## (undated) DEVICE — DRAPE,REIN 53X77,STERILE: Brand: MEDLINE

## (undated) DEVICE — SYR ASPIR ACDA INCL 60ML

## (undated) DEVICE — DEPAUL KNEE ARTHROSCOPY PACK: Brand: MEDLINE INDUSTRIES, INC.

## (undated) DEVICE — Device

## (undated) DEVICE — BASIN EMESIS 500CC ROSE 250/CS 60/PLT: Brand: MEDEGEN MEDICAL PRODUCTS, LLC

## (undated) DEVICE — SYR 50ML SLIP TIP NSAF LF STRL --

## (undated) DEVICE — CANNULA SMOOTH FLEX 8.0 X 72MM: Brand: CLEAR-TRAC

## (undated) DEVICE — SUTURE ETHLN SZ 2-0 L18IN NONABSORBABLE BLK L26MM PS 3/8 585H

## (undated) DEVICE — KIT APPL W/ SPRY TIP ACCELERATE

## (undated) DEVICE — CONTAINER PREFIL FRMLN 15ML --

## (undated) DEVICE — BANDAGE,GAUZE,BULKEE II,4.5"X4.1YD,STRL: Brand: MEDLINE

## (undated) DEVICE — REM POLYHESIVE ADULT PATIENT RETURN ELECTRODE: Brand: VALLEYLAB

## (undated) DEVICE — 4.5 MM HELICUT STRAIGHT BURRS,                                    POWER/EP-1, SLATE, 5000 MAXIMUM RPM,                                    PACKAGED 6 PER BOX, STERILE: Brand: DYONICS HELICUT

## (undated) DEVICE — MEDI-VAC NON-CONDUCTIVE SUCTION TUBING 6MM X 6.1M (20 FT.) L: Brand: CARDINAL HEALTH

## (undated) DEVICE — PREP SKN CHLRAPRP 26ML TNT -- CONVERT TO ITEM 373320

## (undated) DEVICE — 5-IN-1 BARBED CONNECTOR POLYPROPYLENE 3/16 - 9/16 IN. (5 - 14.3 MM): Brand: ARGYLE

## (undated) DEVICE — KIT COLON W/ 1.1OZ LUB AND 2 END

## (undated) DEVICE — DRAPE,U/ SHT,SPLIT,PLAS,STERIL: Brand: MEDLINE

## (undated) DEVICE — GOWN,AURORA,NON-REINFORCED,2XL: Brand: MEDLINE

## (undated) DEVICE — SUT PASS EXPRESSSEW III W/NDL -- PK/5

## (undated) DEVICE — 3L THIN WALL CAN: Brand: CRD

## (undated) DEVICE — KENDALL SCD EXPRESS SLEEVES, KNEE LENGTH, MEDIUM: Brand: KENDALL SCD

## (undated) DEVICE — 3.5 MM STARVAC 90 INTEGRATED CABLE WAND ICW, 90 DEGREE: Brand: COBLATION

## (undated) DEVICE — KERLIX BANDAGE ROLL: Brand: KERLIX

## (undated) DEVICE — SOL IRR L R 3000ML BG --

## (undated) DEVICE — MEDI-VAC NON-CONDUCTIVE SUCTION TUBING: Brand: CARDINAL HEALTH

## (undated) DEVICE — [FOUR SPIKE IRRIGATOR SET,  NON-PYROGENIC FLUID PATH,  DO NOT USE IF PACKAGE IS DAMAGED]

## (undated) DEVICE — FLEX ADVANTAGE 1500CC: Brand: FLEX ADVANTAGE

## (undated) DEVICE — SHOULDER SUSPENSION KIT 6 PER BOX